# Patient Record
Sex: FEMALE | Race: WHITE | NOT HISPANIC OR LATINO | URBAN - METROPOLITAN AREA
[De-identification: names, ages, dates, MRNs, and addresses within clinical notes are randomized per-mention and may not be internally consistent; named-entity substitution may affect disease eponyms.]

---

## 2018-02-18 ENCOUNTER — EMERGENCY (EMERGENCY)
Facility: HOSPITAL | Age: 23
LOS: 1 days | Discharge: HOME | End: 2018-02-18
Attending: EMERGENCY MEDICINE | Admitting: EMERGENCY MEDICINE
Payer: COMMERCIAL

## 2018-02-18 VITALS
TEMPERATURE: 98 F | SYSTOLIC BLOOD PRESSURE: 136 MMHG | OXYGEN SATURATION: 100 % | RESPIRATION RATE: 18 BRPM | HEART RATE: 94 BPM | WEIGHT: 125.44 LBS | DIASTOLIC BLOOD PRESSURE: 91 MMHG

## 2018-02-18 DIAGNOSIS — R42 DIZZINESS AND GIDDINESS: ICD-10-CM

## 2018-02-18 LAB
ALBUMIN SERPL ELPH-MCNC: 4.3 G/DL — SIGNIFICANT CHANGE UP (ref 3.3–5)
ALP SERPL-CCNC: 44 U/L — SIGNIFICANT CHANGE UP (ref 40–120)
ALT FLD-CCNC: 11 U/L — SIGNIFICANT CHANGE UP (ref 10–45)
ANION GAP SERPL CALC-SCNC: 12 MMOL/L — SIGNIFICANT CHANGE UP (ref 5–17)
APPEARANCE UR: CLEAR — SIGNIFICANT CHANGE UP
AST SERPL-CCNC: 15 U/L — SIGNIFICANT CHANGE UP (ref 10–40)
BACTERIA # UR AUTO: PRESENT /HPF
BASOPHILS NFR BLD AUTO: 0.4 % — SIGNIFICANT CHANGE UP (ref 0–2)
BILIRUB SERPL-MCNC: 0.4 MG/DL — SIGNIFICANT CHANGE UP (ref 0.2–1.2)
BILIRUB UR-MCNC: NEGATIVE — SIGNIFICANT CHANGE UP
BUN SERPL-MCNC: 11 MG/DL — SIGNIFICANT CHANGE UP (ref 7–23)
CALCIUM SERPL-MCNC: 9.6 MG/DL — SIGNIFICANT CHANGE UP (ref 8.4–10.5)
CHLORIDE SERPL-SCNC: 103 MMOL/L — SIGNIFICANT CHANGE UP (ref 96–108)
CO2 SERPL-SCNC: 25 MMOL/L — SIGNIFICANT CHANGE UP (ref 22–31)
COLOR SPEC: YELLOW — SIGNIFICANT CHANGE UP
CREAT SERPL-MCNC: 0.71 MG/DL — SIGNIFICANT CHANGE UP (ref 0.5–1.3)
DIFF PNL FLD: NEGATIVE — SIGNIFICANT CHANGE UP
EOSINOPHIL NFR BLD AUTO: 1.6 % — SIGNIFICANT CHANGE UP (ref 0–6)
EPI CELLS # UR: (no result) /HPF (ref 0–5)
GLUCOSE SERPL-MCNC: 88 MG/DL — SIGNIFICANT CHANGE UP (ref 70–99)
GLUCOSE UR QL: NEGATIVE — SIGNIFICANT CHANGE UP
HCT VFR BLD CALC: 40.5 % — SIGNIFICANT CHANGE UP (ref 34.5–45)
HGB BLD-MCNC: 13.9 G/DL — SIGNIFICANT CHANGE UP (ref 11.5–15.5)
KETONES UR-MCNC: NEGATIVE — SIGNIFICANT CHANGE UP
LEUKOCYTE ESTERASE UR-ACNC: (no result)
LYMPHOCYTES # BLD AUTO: 25.5 % — SIGNIFICANT CHANGE UP (ref 13–44)
MCHC RBC-ENTMCNC: 28.8 PG — SIGNIFICANT CHANGE UP (ref 27–34)
MCHC RBC-ENTMCNC: 34.3 G/DL — SIGNIFICANT CHANGE UP (ref 32–36)
MCV RBC AUTO: 84 FL — SIGNIFICANT CHANGE UP (ref 80–100)
MONOCYTES NFR BLD AUTO: 9.5 % — SIGNIFICANT CHANGE UP (ref 2–14)
NEUTROPHILS NFR BLD AUTO: 63 % — SIGNIFICANT CHANGE UP (ref 43–77)
NITRITE UR-MCNC: NEGATIVE — SIGNIFICANT CHANGE UP
PH UR: 7 — SIGNIFICANT CHANGE UP (ref 5–8)
PLATELET # BLD AUTO: 218 K/UL — SIGNIFICANT CHANGE UP (ref 150–400)
POTASSIUM SERPL-MCNC: 4 MMOL/L — SIGNIFICANT CHANGE UP (ref 3.5–5.3)
POTASSIUM SERPL-SCNC: 4 MMOL/L — SIGNIFICANT CHANGE UP (ref 3.5–5.3)
PROT SERPL-MCNC: 7 G/DL — SIGNIFICANT CHANGE UP (ref 6–8.3)
PROT UR-MCNC: NEGATIVE MG/DL — SIGNIFICANT CHANGE UP
RBC # BLD: 4.82 M/UL — SIGNIFICANT CHANGE UP (ref 3.8–5.2)
RBC # FLD: 12.4 % — SIGNIFICANT CHANGE UP (ref 10.3–16.9)
RBC CASTS # UR COMP ASSIST: < 5 /HPF — SIGNIFICANT CHANGE UP
SODIUM SERPL-SCNC: 140 MMOL/L — SIGNIFICANT CHANGE UP (ref 135–145)
SP GR SPEC: 1.02 — SIGNIFICANT CHANGE UP (ref 1–1.03)
UROBILINOGEN FLD QL: 1 E.U./DL — SIGNIFICANT CHANGE UP
WBC # BLD: 6.8 K/UL — SIGNIFICANT CHANGE UP (ref 3.8–10.5)
WBC # FLD AUTO: 6.8 K/UL — SIGNIFICANT CHANGE UP (ref 3.8–10.5)
WBC UR QL: (no result) /HPF

## 2018-02-18 PROCEDURE — 87086 URINE CULTURE/COLONY COUNT: CPT

## 2018-02-18 PROCEDURE — 93010 ELECTROCARDIOGRAM REPORT: CPT

## 2018-02-18 PROCEDURE — 85025 COMPLETE CBC W/AUTO DIFF WBC: CPT

## 2018-02-18 PROCEDURE — 81001 URINALYSIS AUTO W/SCOPE: CPT

## 2018-02-18 PROCEDURE — 87184 SC STD DISK METHOD PER PLATE: CPT

## 2018-02-18 PROCEDURE — 80053 COMPREHEN METABOLIC PANEL: CPT

## 2018-02-18 PROCEDURE — 93005 ELECTROCARDIOGRAM TRACING: CPT

## 2018-02-18 PROCEDURE — 99283 EMERGENCY DEPT VISIT LOW MDM: CPT | Mod: 25

## 2018-02-18 PROCEDURE — 99284 EMERGENCY DEPT VISIT MOD MDM: CPT | Mod: 25

## 2018-02-18 PROCEDURE — 82962 GLUCOSE BLOOD TEST: CPT

## 2018-02-18 NOTE — ED PROVIDER NOTE - OBJECTIVE STATEMENT
23 yo F prev healthy in school with episodes of vague dizziness over the past week at rest and with walking- no headaches or diplopia-  no hearing difficulty- no neck pain or N/V or room spinning- her Mom  2 weeks ago- has been able to sleep and eat- feeling sad, but not depressed- no SI or HI- not hearing voices-has mild anxiety in the past-   pt denies  any chest pain or sob no leg pain or calf tenderness

## 2018-02-18 NOTE — ED PROVIDER NOTE - MEDICAL DECISION MAKING DETAILS
nl labs- not orthostatic- no ekg changes-  recent loss of mom 2 weeks ago  likely anxiety rekated 23 yo F with dizziness x 1 week no focal neuro deficits  no signs of TIA/ CVA--NIH SS=0// not orthostatic appears well hydrated-- nl labs- not orthostatic- no ekg changes-  recent loss of mom 2 weeks ago - rec neuro and psych follow up in 2 days benedryl for sleep prn-  dw father

## 2018-02-21 LAB
-  AMPICILLIN: SIGNIFICANT CHANGE UP
-  CLINDAMYCIN: SIGNIFICANT CHANGE UP
-  ERYTHROMYCIN: SIGNIFICANT CHANGE UP
-  PENICILLIN: SIGNIFICANT CHANGE UP
-  VANCOMYCIN: SIGNIFICANT CHANGE UP
CULTURE RESULTS: SIGNIFICANT CHANGE UP
METHOD TYPE: SIGNIFICANT CHANGE UP
ORGANISM # SPEC MICROSCOPIC CNT: SIGNIFICANT CHANGE UP
ORGANISM # SPEC MICROSCOPIC CNT: SIGNIFICANT CHANGE UP
SPECIMEN SOURCE: SIGNIFICANT CHANGE UP

## 2018-02-21 NOTE — ED POST DISCHARGE NOTE - RESULT SUMMARY
urine c/s discussed with pt, pt has no s/s UTI, no vaginal discharge, no treatment at this time. she will f/u with her GYN

## 2018-05-10 NOTE — ED PROVIDER NOTE - CONDUCTED A DETAILED DISCUSSION WITH PATIENT AND/OR GUARDIAN REGARDING, MDM
lab results/return to ED if symptoms worsen, persist or questions arise/need for outpatient follow-up DISPLAY PLAN FREE TEXT

## 2019-05-15 ENCOUNTER — EMERGENCY (EMERGENCY)
Facility: HOSPITAL | Age: 24
LOS: 1 days | Discharge: HOME | End: 2019-05-15
Attending: EMERGENCY MEDICINE | Admitting: EMERGENCY MEDICINE
Payer: MEDICAID

## 2019-05-15 VITALS
DIASTOLIC BLOOD PRESSURE: 76 MMHG | SYSTOLIC BLOOD PRESSURE: 111 MMHG | HEART RATE: 80 BPM | OXYGEN SATURATION: 100 % | RESPIRATION RATE: 18 BRPM | TEMPERATURE: 98 F

## 2019-05-15 VITALS
SYSTOLIC BLOOD PRESSURE: 121 MMHG | WEIGHT: 128.97 LBS | RESPIRATION RATE: 18 BRPM | DIASTOLIC BLOOD PRESSURE: 81 MMHG | HEART RATE: 94 BPM | TEMPERATURE: 98 F | OXYGEN SATURATION: 100 % | HEIGHT: 63 IN

## 2019-05-15 DIAGNOSIS — R10.84 GENERALIZED ABDOMINAL PAIN: ICD-10-CM

## 2019-05-15 DIAGNOSIS — R19.7 DIARRHEA, UNSPECIFIED: ICD-10-CM

## 2019-05-15 DIAGNOSIS — R14.0 ABDOMINAL DISTENSION (GASEOUS): ICD-10-CM

## 2019-05-15 LAB
ALBUMIN SERPL ELPH-MCNC: 4.4 G/DL — SIGNIFICANT CHANGE UP (ref 3.3–5)
ALP SERPL-CCNC: 57 U/L — SIGNIFICANT CHANGE UP (ref 40–120)
ALT FLD-CCNC: 12 U/L — SIGNIFICANT CHANGE UP (ref 10–45)
AMYLASE P1 CFR SERPL: 89 U/L — SIGNIFICANT CHANGE UP (ref 25–125)
ANION GAP SERPL CALC-SCNC: 8 MMOL/L — SIGNIFICANT CHANGE UP (ref 5–17)
APPEARANCE UR: CLEAR — SIGNIFICANT CHANGE UP
AST SERPL-CCNC: 16 U/L — SIGNIFICANT CHANGE UP (ref 10–40)
BASOPHILS # BLD AUTO: 0.05 K/UL — SIGNIFICANT CHANGE UP (ref 0–0.2)
BASOPHILS NFR BLD AUTO: 0.9 % — SIGNIFICANT CHANGE UP (ref 0–2)
BILIRUB SERPL-MCNC: 0.2 MG/DL — SIGNIFICANT CHANGE UP (ref 0.2–1.2)
BILIRUB UR-MCNC: NEGATIVE — SIGNIFICANT CHANGE UP
BUN SERPL-MCNC: 8 MG/DL — SIGNIFICANT CHANGE UP (ref 7–23)
CALCIUM SERPL-MCNC: 9.7 MG/DL — SIGNIFICANT CHANGE UP (ref 8.4–10.5)
CHLORIDE SERPL-SCNC: 104 MMOL/L — SIGNIFICANT CHANGE UP (ref 96–108)
CO2 SERPL-SCNC: 27 MMOL/L — SIGNIFICANT CHANGE UP (ref 22–31)
COLOR SPEC: YELLOW — SIGNIFICANT CHANGE UP
CREAT SERPL-MCNC: 0.62 MG/DL — SIGNIFICANT CHANGE UP (ref 0.5–1.3)
DIFF PNL FLD: NEGATIVE — SIGNIFICANT CHANGE UP
EOSINOPHIL # BLD AUTO: 0.17 K/UL — SIGNIFICANT CHANGE UP (ref 0–0.5)
EOSINOPHIL NFR BLD AUTO: 3.1 % — SIGNIFICANT CHANGE UP (ref 0–6)
GLUCOSE SERPL-MCNC: 81 MG/DL — SIGNIFICANT CHANGE UP (ref 70–99)
GLUCOSE UR QL: NEGATIVE — SIGNIFICANT CHANGE UP
HCT VFR BLD CALC: 41.5 % — SIGNIFICANT CHANGE UP (ref 34.5–45)
HGB BLD-MCNC: 13.9 G/DL — SIGNIFICANT CHANGE UP (ref 11.5–15.5)
IMM GRANULOCYTES NFR BLD AUTO: 0.2 % — SIGNIFICANT CHANGE UP (ref 0–1.5)
KETONES UR-MCNC: NEGATIVE — SIGNIFICANT CHANGE UP
LEUKOCYTE ESTERASE UR-ACNC: ABNORMAL
LIDOCAIN IGE QN: 23 U/L — SIGNIFICANT CHANGE UP (ref 7–60)
LYMPHOCYTES # BLD AUTO: 2.03 K/UL — SIGNIFICANT CHANGE UP (ref 1–3.3)
LYMPHOCYTES # BLD AUTO: 37 % — SIGNIFICANT CHANGE UP (ref 13–44)
MCHC RBC-ENTMCNC: 28.4 PG — SIGNIFICANT CHANGE UP (ref 27–34)
MCHC RBC-ENTMCNC: 33.5 GM/DL — SIGNIFICANT CHANGE UP (ref 32–36)
MCV RBC AUTO: 84.9 FL — SIGNIFICANT CHANGE UP (ref 80–100)
MONOCYTES # BLD AUTO: 0.53 K/UL — SIGNIFICANT CHANGE UP (ref 0–0.9)
MONOCYTES NFR BLD AUTO: 9.7 % — SIGNIFICANT CHANGE UP (ref 2–14)
NEUTROPHILS # BLD AUTO: 2.7 K/UL — SIGNIFICANT CHANGE UP (ref 1.8–7.4)
NEUTROPHILS NFR BLD AUTO: 49.1 % — SIGNIFICANT CHANGE UP (ref 43–77)
NITRITE UR-MCNC: NEGATIVE — SIGNIFICANT CHANGE UP
NRBC # BLD: 0 /100 WBCS — SIGNIFICANT CHANGE UP (ref 0–0)
PH UR: 6.5 — SIGNIFICANT CHANGE UP (ref 5–8)
PLATELET # BLD AUTO: 221 K/UL — SIGNIFICANT CHANGE UP (ref 150–400)
POTASSIUM SERPL-MCNC: 4 MMOL/L — SIGNIFICANT CHANGE UP (ref 3.5–5.3)
POTASSIUM SERPL-SCNC: 4 MMOL/L — SIGNIFICANT CHANGE UP (ref 3.5–5.3)
PROT SERPL-MCNC: 7.5 G/DL — SIGNIFICANT CHANGE UP (ref 6–8.3)
PROT UR-MCNC: NEGATIVE MG/DL — SIGNIFICANT CHANGE UP
RBC # BLD: 4.89 M/UL — SIGNIFICANT CHANGE UP (ref 3.8–5.2)
RBC # FLD: 12.3 % — SIGNIFICANT CHANGE UP (ref 10.3–14.5)
SODIUM SERPL-SCNC: 139 MMOL/L — SIGNIFICANT CHANGE UP (ref 135–145)
SP GR SPEC: 1.01 — SIGNIFICANT CHANGE UP (ref 1–1.03)
UROBILINOGEN FLD QL: 0.2 E.U./DL — SIGNIFICANT CHANGE UP
WBC # BLD: 5.49 K/UL — SIGNIFICANT CHANGE UP (ref 3.8–10.5)
WBC # FLD AUTO: 5.49 K/UL — SIGNIFICANT CHANGE UP (ref 3.8–10.5)

## 2019-05-15 PROCEDURE — 81001 URINALYSIS AUTO W/SCOPE: CPT

## 2019-05-15 PROCEDURE — 36415 COLL VENOUS BLD VENIPUNCTURE: CPT

## 2019-05-15 PROCEDURE — 80053 COMPREHEN METABOLIC PANEL: CPT

## 2019-05-15 PROCEDURE — 99283 EMERGENCY DEPT VISIT LOW MDM: CPT

## 2019-05-15 PROCEDURE — 83690 ASSAY OF LIPASE: CPT

## 2019-05-15 PROCEDURE — 99284 EMERGENCY DEPT VISIT MOD MDM: CPT

## 2019-05-15 PROCEDURE — 85025 COMPLETE CBC W/AUTO DIFF WBC: CPT

## 2019-05-15 PROCEDURE — 82150 ASSAY OF AMYLASE: CPT

## 2019-05-15 NOTE — ED PROVIDER NOTE - ATTENDING CONTRIBUTION TO CARE
intermittent abdominal discomfort/ bloating for past 2 months with change in stool color (green).  has had before but not for this long.  currently no pain, abd soft, non tender.  well appearing.  basic labs wnl.  no liver dysfunction.  to f/u with pmd, r/o ibs

## 2019-05-15 NOTE — ED ADULT NURSE NOTE - CHPI ED NUR SYMPTOMS NEG
no hematuria/no vomiting/no nausea/no fever/no abdominal distension/no burning urination/no chills/no diarrhea/no dysuria

## 2019-05-15 NOTE — ED PROVIDER NOTE - OBJECTIVE STATEMENT
24 yo female in the ER c/o abdominal bloating and cramping pains x 2 month. pain is intermittent, mostly after the meal. Pt also mentioned that her stool has been green color with occasional diarrhea. Pt denies f/c, n/v, appetite changes or diet changes, denies weight loss, new medications intake, recent travel. pt admits that she has been having some difficulties with medical insurance and couldn't see her doctor, decided today to come to ER for evaluation.

## 2019-05-15 NOTE — ED PROVIDER NOTE - CLINICAL SUMMARY MEDICAL DECISION MAKING FREE TEXT BOX
22 yo female in the ER due to intermittent abdominal pain, bloating, changes in BM x 2 month. Appears well and has no pain today. denies weight loss, appetite changes or diet changes, denies recent travel or new medications intake. exam- unremarkable, soft m, benign abdomen. basic labs done- no acute findings. will d/c for out pt f/u with GI.

## 2019-05-15 NOTE — ED ADULT NURSE NOTE - NSIMPLEMENTINTERV_GEN_ALL_ED
Implemented All Universal Safety Interventions:  [place] to call system. Call bell, personal items and telephone within reach. Instruct patient to call for assistance. Room bathroom lighting operational. Non-slip footwear when patient is off stretcher. Physically safe environment: no spills, clutter or unnecessary equipment. Stretcher in lowest position, wheels locked, appropriate side rails in place.

## 2019-05-15 NOTE — ED ADULT TRIAGE NOTE - CHIEF COMPLAINT QUOTE
"my stool is green" x two months. Pt endorses intermittent mild abdom pain and bloating. Reports family hx of gallstones.

## 2019-05-15 NOTE — ED PROVIDER NOTE - NSFOLLOWUPCLINICS_GEN_ALL_ED_FT
[facility]  Family Medicine  [facility]. [address]  [place], [place] [facility]  Phone: [phone]  Fax:   Follow Up Time:

## 2019-05-15 NOTE — ED PROVIDER NOTE - CARE PROVIDER_API CALL
Stone Benjamin)  [facility]  [address], [address]  [place] [address]  Phone: [phone]  Fax: [phone]  Follow Up Time:

## 2019-05-15 NOTE — ED PROVIDER NOTE - NSFOLLOWUPINSTRUCTIONS_ED_ALL_ED_FT
I have discussed the discharge plan with the patient. The patient agrees with the plan, as discussed.  The patient understands Emergency Department diagnosis is a preliminary diagnosis often based on limited information and that the patient must adhere to the follow-up plan as discussed.  The patient understands that if the symptoms worsen or if prescribed medications do not have the desired/planned effect that the patient may return to the Emergency Department at any time for further evaluation and treatment.      Abdominal Pain, Adult  Abdominal pain can be caused by many things. Often, abdominal pain is not serious and it gets better with no treatment or by being treated at home. However, sometimes abdominal pain is serious. Your health care provider will do a medical history and a physical exam to try to determine the cause of your abdominal pain.    Follow these instructions at home:  Take over-the-counter and prescription medicines only as told by your health care provider. Do not take a laxative unless told by your health care provider.  Drink enough fluid to keep your urine clear or pale yellow.  Watch your condition for any changes.  Keep all follow-up visits as told by your health care provider. This is important.  Contact a health care provider if:  Your abdominal pain changes or gets worse.  You are not hungry or you lose weight without trying.  You are constipated or have diarrhea for more than 2–3 days.  You have pain when you urinate or have a bowel movement.  Your abdominal pain wakes you up at night.  Your pain gets worse with meals, after eating, or with certain foods.  You are throwing up and cannot keep anything down.  You have a fever.  Get help right away if:  Your pain does not go away as soon as your health care provider told you to expect.  You cannot stop throwing up.  Your pain is only in areas of the abdomen, such as the right side or the left lower portion of the abdomen.  You have bloody or black stools, or stools that look like tar.  You have severe pain, cramping, or bloating in your abdomen.  You have signs of dehydration, such as:  Dark urine, very little urine, or no urine.  Cracked lips.  Dry mouth.  Sunken eyes.  Sleepiness.  Weakness.  This information is not intended to replace advice given to you by your health care provider. Make sure you discuss any questions you have with your health care provider.            ABDOMINAL PAIN - AfterCare(R) Instructions(ER/ED)     Abdominal Pain    WHAT YOU NEED TO KNOW:    Abdominal pain can be dull, achy, or sharp. You may have pain in one area of your abdomen, or in your entire abdomen. Your pain may be caused by a condition such as constipation, food sensitivity or poisoning, infection, or a blockage. Abdominal pain can also be from a hernia, appendicitis, or an ulcer. Liver, gallbladder, or kidney conditions can also cause abdominal pain. The cause of your abdominal pain may be unknown.     DISCHARGE INSTRUCTIONS:    Return to the emergency department if:     You have new chest pain or shortness of breath.      You have pulsing pain in your upper abdomen or lower back that suddenly becomes constant.      Your pain is in the right lower abdominal area and worsens with movement.       You have a fever over 100.4°F (38°C) or shaking chills.       You are vomiting and cannot keep food or liquids down.       Your pain does not improve or gets worse over the next 8 to 12 hours.       You see blood in your vomit or bowel movements, or they look black and tarry.       Your skin or the whites of your eyes turn yellow.       You are a woman and have a large amount of vaginal bleeding that is not your monthly period.     Contact your healthcare provider if:     You have pain in your lower back.       You are a man and have pain in your testicles.      You have pain when you urinate.       You have questions or concerns about your condition or care.    Follow up with your healthcare provider within 24 hours or as directed: Write down your questions so you remember to ask them during your visits.     Medicines:     Medicines may be given to calm your stomach and prevent vomiting or to decrease pain. Ask how to take pain medicine safely.      Take your medicine as directed. Contact your healthcare provider if you think your medicine is not helping or if you have side effects. Tell him of her if you are allergic to any medicine. Keep a list of the medicines, vitamins, and herbs you take. Include the amounts, and when and why you take them. Bring the list or the pill bottles to follow-up visits. Carry your medicine list with you in case of an emergency.

## 2019-12-26 ENCOUNTER — EMERGENCY (EMERGENCY)
Facility: HOSPITAL | Age: 24
LOS: 1 days | Discharge: HOME | End: 2019-12-26
Admitting: EMERGENCY MEDICINE
Payer: COMMERCIAL

## 2019-12-26 VITALS
DIASTOLIC BLOOD PRESSURE: 71 MMHG | OXYGEN SATURATION: 100 % | WEIGHT: 139.99 LBS | HEART RATE: 81 BPM | RESPIRATION RATE: 18 BRPM | TEMPERATURE: 98 F | SYSTOLIC BLOOD PRESSURE: 121 MMHG

## 2019-12-26 PROBLEM — K80.20 CALCULUS OF GALLBLADDER WITHOUT CHOLECYSTITIS WITHOUT OBSTRUCTION: Chronic | Status: ACTIVE | Noted: 2019-05-15

## 2019-12-26 PROCEDURE — 99283 EMERGENCY DEPT VISIT LOW MDM: CPT

## 2019-12-26 PROCEDURE — 99282 EMERGENCY DEPT VISIT SF MDM: CPT

## 2019-12-26 RX ORDER — DIPHENHYDRAMINE HCL 50 MG
50 CAPSULE ORAL ONCE
Refills: 0 | Status: COMPLETED | OUTPATIENT
Start: 2019-12-26 | End: 2019-12-26

## 2019-12-26 RX ORDER — DIPHENHYDRAMINE HCL 50 MG
1 CAPSULE ORAL
Qty: 15 | Refills: 0
Start: 2019-12-26 | End: 2019-12-30

## 2019-12-26 RX ORDER — FAMOTIDINE 10 MG/ML
1 INJECTION INTRAVENOUS
Qty: 10 | Refills: 0
Start: 2019-12-26 | End: 2019-12-30

## 2019-12-26 RX ADMIN — Medication 50 MILLIGRAM(S): at 14:16

## 2019-12-26 NOTE — ED PROVIDER NOTE - CLINICAL SUMMARY MEDICAL DECISION MAKING FREE TEXT BOX
25 yo F with no pmh c/o itching rash to upper chest and back since this morning. Denies fever, chills, n/v, lip/tongue swelling, dysphagia or sob. Pt denies any new medications, detergents, lotions or exposures. Used a new foundation but did not put it on her chest. +fine papular rash to chest and upper back. ?allergic reaction. No oral involvement. Given benadryl.

## 2019-12-26 NOTE — ED PROVIDER NOTE - OBJECTIVE STATEMENT
25 yo F with no pmh c/o itching rash to upper chest and back since this morning. Denies fever, chills, n/v, lip/tongue swelling, dysphagia or sob. Pt denies any new medications, detergents, lotions or exposures. Used a new foundation but did not put it on her chest.

## 2019-12-26 NOTE — ED PROVIDER NOTE - NSFOLLOWUPINSTRUCTIONS_ED_ALL_ED_FT
Rash    A rash is a change in the color of the skin. A rash can also change the way your skin feels. There are many different conditions and factors that can cause a rash, most of which are not dangerous. Make sure to follow up with your primary care physician or a dermatologist as instructed by your health care provider.    SEEK IMMEDIATE MEDICAL CARE IF YOU HAVE ANY OF THE FOLLOWING SYMPTOMS: fever, blisters, a rash inside your mouth, vaginal or anal pain, or altered mental status.    Allergic Reaction    An allergic reaction is an abnormal reaction to a substance (allergen) by the body's defense system. Common allergens include medicines, food, insect bites or stings, and blood products. The body releases certain proteins into the blood that can cause a variety of symptoms such as an itchy rash, wheezing, swelling of the face/lips/tongue/throat, abdominal pain, nausea or vomiting. An allergic reaction is usually treated with medication. If your health care provider prescribed you an epinephrine injection device, make sure to keep it with you at all times.    SEEK IMMEDIATE MEDICAL CARE IF YOU HAVE ANY OF THE FOLLOWING SYMPTOMS: allergic reaction severe enough that required you to use epinephrine, tightness in your chest, swelling around your lips/tongue/throat, abdominal pain, vomiting or diarrhea, or lightheadedness/dizziness. These symptoms may represent a serious problem that is an emergency. Do not wait to see if the symptoms will go away. Use your auto-injector pen or anaphylaxis kit as you have been instructed. Call 911 and do not drive yourself to the hospital.

## 2019-12-26 NOTE — ED PROVIDER NOTE - PHYSICAL EXAMINATION
CONSTITUTIONAL: Well-appearing; well-nourished; in no apparent distress.   HEAD: Normocephalic; atraumatic.   EYES: PERRL; EOM intact; conjunctiva and sclera clear  ENT: normal nose; no rhinorrhea; normal pharynx with no erythema or lesions.   NECK: Supple; non-tender;   CARDIOVASCULAR: Normal S1, S2; no murmurs, rubs, or gallops. Regular rate and rhythm.   RESPIRATORY: Breathing easily; breath sounds clear and equal bilaterally; no wheezes, rhonchi, or rales.  MSK: FROM at all extremities, normal tone   EXT: No cyanosis or edema; N/V intact  SKIN: +fine papular rash to chest and upper back

## 2019-12-26 NOTE — ED PROVIDER NOTE - PATIENT PORTAL LINK FT
You can access the FollowMyHealth Patient Portal offered by [facility] by registering at the following website: [email or web address]. By joining [employer]’s FollowMyHealth portal, you will also be able to view your health information using other applications (apps) compatible with our system.

## 2019-12-26 NOTE — ED ADULT TRIAGE NOTE - CHIEF COMPLAINT QUOTE
pt complaining of itchy rash to chest and back area first noticed this morning denies known allergies no new detergents changes in routine or new foods. Tried new make up foundation today but face is not affected. Denies SOB tongue or throat itching respirations even and unlabored no recent travel

## 2019-12-30 DIAGNOSIS — R21 RASH AND OTHER NONSPECIFIC SKIN ERUPTION: ICD-10-CM

## 2020-03-19 ENCOUNTER — EMERGENCY (EMERGENCY)
Facility: HOSPITAL | Age: 25
LOS: 1 days | Discharge: HOME | End: 2020-03-19
Admitting: EMERGENCY MEDICINE
Payer: COMMERCIAL

## 2020-03-19 VITALS
HEART RATE: 106 BPM | WEIGHT: 142.64 LBS | TEMPERATURE: 98 F | DIASTOLIC BLOOD PRESSURE: 80 MMHG | SYSTOLIC BLOOD PRESSURE: 147 MMHG | OXYGEN SATURATION: 98 % | RESPIRATION RATE: 18 BRPM | HEIGHT: 64 IN

## 2020-03-19 DIAGNOSIS — R05 COUGH: ICD-10-CM

## 2020-03-19 DIAGNOSIS — B34.9 VIRAL INFECTION, UNSPECIFIED: ICD-10-CM

## 2020-03-19 LAB — RAPID RVP RESULT: SIGNIFICANT CHANGE UP

## 2020-03-19 PROCEDURE — 87581 M.PNEUMON DNA AMP PROBE: CPT

## 2020-03-19 PROCEDURE — 87798 DETECT AGENT NOS DNA AMP: CPT

## 2020-03-19 PROCEDURE — 87633 RESP VIRUS 12-25 TARGETS: CPT

## 2020-03-19 PROCEDURE — 99283 EMERGENCY DEPT VISIT LOW MDM: CPT

## 2020-03-19 PROCEDURE — 87486 CHLMYD PNEUM DNA AMP PROBE: CPT

## 2020-03-19 PROCEDURE — 99284 EMERGENCY DEPT VISIT MOD MDM: CPT

## 2020-03-19 NOTE — ED PROVIDER NOTE - CLINICAL SUMMARY MEDICAL DECISION MAKING FREE TEXT BOX
25 y/o F presenting to the ED with cough, SOB, tactile F since this morning. Patient appears well, nontoxic, with HR of 106, T of 97.6, 98% O2 sat. On exam, patient appears well and nontoxic, boggy nares, clear nasal discharge bilaterally but no posterior oral pharynx erythema. Lungs clear to ascultation. ? viral like syndrome. As per covid19 screening algorithm pt fits into cluster groups concern for covid with respiratory complaint but has NO recent travel or close contact with confirmed case;  RVP sent,  tracking tool complete (no covid testing as per algorithm due to limited access to testing-low priority). Pt therefore educated on supportive care/hand hygiene, 14 day home isolation only leaving for essentials/avoiding large groups/public transportation and given information as to why he/she was not tested.  discussed strict return parameters and pmd follow up-calling pmd before seeking further medical attention- given appropriate contact information to call for resources 23 y/o F presenting to the ED with cough, SOB, tactile F since this morning. Patient appears well, nontoxic, with HR of 106, T of 97.6, 98% O2 sat. On exam, patient appears well and nontoxic, boggy nares, clear nasal discharge bilaterally but no posterior oral pharynx erythema. Lungs clear to ascultation. ? viral like syndrome vs allergies . As per covid19 screening algorithm pt fits into cluster groups concern for covid with respiratory complaint but has NO recent travel or close contact with confirmed case;  RVP sent,  tracking tool complete (no covid testing as per algorithm due to limited access to testing-low priority). Pt therefore educated on supportive care/hand hygiene, 14 day home isolation only leaving for essentials/avoiding large groups/public transportation and given information as to why he/she was not tested.  discussed strict return parameters and pmd follow up-calling pmd before seeking further medical attention- given appropriate contact information to call for resources

## 2020-03-19 NOTE — ED PROVIDER NOTE - NSFOLLOWUPINSTRUCTIONS_ED_ALL_ED_FT
If you develop worsening symptoms, such as severe shortness of breath, please call [phone] option #9. They will assist you in determining your next steps.     You will receive a call today with your viral swab results.  As of now you should isolate yourself for the next 14 days by staying home from work/school or other obligations.  Avoid highly populated and public areas. Avoid using public transportation, ride-sharing, or taxis.   As much as possible, separate yourself from other people in your home. If you can, you should sleep in a room away from other people in your home. Also, you should use a separate bathroom, if available.   Wear the supplied mask whenever you are around other people.   If you have a non-urgent medical appointment, please reschedule for a later date. If the appointment is urgent, please call the healthcare provider first   Wash your hands often with soap and water for at least 15 to 20 seconds or clean your hands with an alcohol-based hand  that contains 60 to 95% alcohol, covering all surfaces of your hands and rubbing them together until they feel dry. Soap and water should be used preferentially if hands are visibly dirty.   Cover your mouth and nose with a tissue when you cough or sneeze. Throw used tissues in a lined trash can; immediately wash your hands.   Avoid touching your eyes, nose, and mouth with your hands.   Avoid sharing personal household items. You should not share dishes, drinking glasses, cups, eating utensils, towels, or bedding with other people or pets in your home. After using these items, they should be washed thoroughly with soap and water.   Clean and disinfect all “high-touch” surfaces every day. High touch surfaces include counters, tabletops, doorknobs, light switches, remote controls, bathroom fixtures, toilets, phones, keyboards, tablets, and bedside tables. Also, clean any surfaces that may have blood, stool, or body fluids on them.

## 2020-03-19 NOTE — ED PROVIDER NOTE - PHYSICAL EXAMINATION
General: Patient is well developed and well nourised. Patient is alert and oriented to person, place and date. Patient is laying comfortably in stretcher and appears in no acute distress.  HEENT: Head is normocephalic and atraumatic. Pupils are equal, round and reactive to light and accommodation. Extraocular movements intact. No evidence of nystagmus, conjunctival injection, or scleral icterus. External ears symmetric and non-tender without evidence of discharge. Ear canals are clear without evidence of edema or erythema. Cone of light evident on tympanic membrade without evidence of erythema, retraction or bulge. No hemotympanum present bilaterally.  Nose is symmetric, non-tender, patent without evidence of discharge. Teeth in good repair. Uvula midline. Pharynx without erythema, edema, tonsillar enlargement or exudates.   Neck: Supple and nontender, with no evidence of lymphadenopathy. No cervical spinal tenderness. Full range of motion.  Heart: Regular rate and rhythm. No murmurs, rubs or gallops.   Lungs: Clear to auscultation bilaterally with equal chest expansion. No note of wheezes, rhonchi, rales. Equal chest expansion. No note of retractions.  Abdomen: Bowel sounds present in all four quadrants. Soft, non-tender, non-distended without signs of masses, rebound or guarding. No note of hepatosplenomegaly. No CVA tenderness bilaterally. Negative Rivas sign. No pain present over McBurney's point.  Musculoskeletal: No edema, erythema, ecchymosis, atrophy or deformity. Full range of motion in all four extremities.  No clubbing or cyanosis. No point tenderness to palpation.   Neuro: Cranial nerves intact. GCS 15. Moving all extremities without discomfort. Sensation intact. Gait steady  Skin: Warm, dry and intact without evidence of rashes, bruising, pallor, jaundice or cyanosis.   Psych: Mood and affect appropriate.

## 2020-03-19 NOTE — ED PROVIDER NOTE - PATIENT PORTAL LINK FT
You can access the FollowMyHealth Patient Portal offered by [facility] by registering at the following website: [email or web address]. By joining [employer]’s FollowMyHealth portal, you will also be able to view your health information using other applications (apps) compatible with our system.

## 2020-03-19 NOTE — ED PROVIDER NOTE - OBJECTIVE STATEMENT
23 y/o F presenting to the ED with cough, sensations of SOB, which began earlier this morning when she woke up. Felt as if something was in her throat but had no chest pain, palpitations. Felt very nervous and states that she was so worried that she cried. 25 y/o F presenting to the ED with cough, sensations of SOB, which began earlier this morning when she woke up. Felt as if something was in her throat but had no chest pain, palpitations. Felt very nervous and states that she was so worried that she cried. No history of smoking, + COVID contacts, high risk travel, or any other complaints. 23 y/o F presenting to the ED with cough, sensations of SOB, which began earlier this morning when she woke up. Felt as if something was in her throat but had no chest pain, palpitations. Felt very nervous and states that she was so worried that she cried. No history of smoking, + COVID contacts, high risk travel, or any other complaints. patient questions if she had an allergic reaction.

## 2020-03-19 NOTE — ED ADULT NURSE NOTE - OBJECTIVE STATEMENT
25 y/o female presents to ED c/o dry cough associated w/ mild sore throat. Denies fever, chills, +COVID contacts, CP, and any other sx.

## 2020-03-29 ENCOUNTER — EMERGENCY (EMERGENCY)
Facility: HOSPITAL | Age: 25
LOS: 1 days | Discharge: HOME | End: 2020-03-29
Attending: EMERGENCY MEDICINE | Admitting: EMERGENCY MEDICINE
Payer: COMMERCIAL

## 2020-03-29 VITALS
HEART RATE: 137 BPM | SYSTOLIC BLOOD PRESSURE: 123 MMHG | OXYGEN SATURATION: 100 % | RESPIRATION RATE: 18 BRPM | DIASTOLIC BLOOD PRESSURE: 88 MMHG | HEIGHT: 64 IN | TEMPERATURE: 98 F | WEIGHT: 139.99 LBS

## 2020-03-29 VITALS
RESPIRATION RATE: 20 BRPM | OXYGEN SATURATION: 100 % | HEART RATE: 99 BPM | TEMPERATURE: 99 F | SYSTOLIC BLOOD PRESSURE: 135 MMHG | DIASTOLIC BLOOD PRESSURE: 81 MMHG

## 2020-03-29 DIAGNOSIS — R06.02 SHORTNESS OF BREATH: ICD-10-CM

## 2020-03-29 DIAGNOSIS — R05 COUGH: ICD-10-CM

## 2020-03-29 DIAGNOSIS — R07.9 CHEST PAIN, UNSPECIFIED: ICD-10-CM

## 2020-03-29 DIAGNOSIS — R50.9 FEVER, UNSPECIFIED: ICD-10-CM

## 2020-03-29 LAB
ALBUMIN SERPL ELPH-MCNC: 4.7 G/DL — SIGNIFICANT CHANGE UP (ref 3.3–5)
ALP SERPL-CCNC: 48 U/L — SIGNIFICANT CHANGE UP (ref 40–120)
ALT FLD-CCNC: 19 U/L — SIGNIFICANT CHANGE UP (ref 10–45)
ANION GAP SERPL CALC-SCNC: 17 MMOL/L — SIGNIFICANT CHANGE UP (ref 5–17)
AST SERPL-CCNC: 22 U/L — SIGNIFICANT CHANGE UP (ref 10–40)
BASOPHILS # BLD AUTO: 0.01 K/UL — SIGNIFICANT CHANGE UP (ref 0–0.2)
BASOPHILS NFR BLD AUTO: 0.1 % — SIGNIFICANT CHANGE UP (ref 0–2)
BILIRUB SERPL-MCNC: 0.7 MG/DL — SIGNIFICANT CHANGE UP (ref 0.2–1.2)
BUN SERPL-MCNC: 7 MG/DL — SIGNIFICANT CHANGE UP (ref 7–23)
CALCIUM SERPL-MCNC: 10 MG/DL — SIGNIFICANT CHANGE UP (ref 8.4–10.5)
CHLORIDE SERPL-SCNC: 103 MMOL/L — SIGNIFICANT CHANGE UP (ref 96–108)
CO2 SERPL-SCNC: 19 MMOL/L — LOW (ref 22–31)
CREAT SERPL-MCNC: 0.71 MG/DL — SIGNIFICANT CHANGE UP (ref 0.5–1.3)
D DIMER BLD IA.RAPID-MCNC: <150 NG/ML DDU — SIGNIFICANT CHANGE UP
EOSINOPHIL # BLD AUTO: 0.04 K/UL — SIGNIFICANT CHANGE UP (ref 0–0.5)
EOSINOPHIL NFR BLD AUTO: 0.6 % — SIGNIFICANT CHANGE UP (ref 0–6)
GLUCOSE SERPL-MCNC: 83 MG/DL — SIGNIFICANT CHANGE UP (ref 70–99)
HCG SERPL-ACNC: <0 MIU/ML — SIGNIFICANT CHANGE UP
HCT VFR BLD CALC: 45 % — SIGNIFICANT CHANGE UP (ref 34.5–45)
HGB BLD-MCNC: 15.1 G/DL — SIGNIFICANT CHANGE UP (ref 11.5–15.5)
IMM GRANULOCYTES NFR BLD AUTO: 0.4 % — SIGNIFICANT CHANGE UP (ref 0–1.5)
LYMPHOCYTES # BLD AUTO: 1.44 K/UL — SIGNIFICANT CHANGE UP (ref 1–3.3)
LYMPHOCYTES # BLD AUTO: 21 % — SIGNIFICANT CHANGE UP (ref 13–44)
MCHC RBC-ENTMCNC: 27.5 PG — SIGNIFICANT CHANGE UP (ref 27–34)
MCHC RBC-ENTMCNC: 33.6 GM/DL — SIGNIFICANT CHANGE UP (ref 32–36)
MCV RBC AUTO: 82 FL — SIGNIFICANT CHANGE UP (ref 80–100)
MONOCYTES # BLD AUTO: 0.43 K/UL — SIGNIFICANT CHANGE UP (ref 0–0.9)
MONOCYTES NFR BLD AUTO: 6.3 % — SIGNIFICANT CHANGE UP (ref 2–14)
NEUTROPHILS # BLD AUTO: 4.91 K/UL — SIGNIFICANT CHANGE UP (ref 1.8–7.4)
NEUTROPHILS NFR BLD AUTO: 71.6 % — SIGNIFICANT CHANGE UP (ref 43–77)
NRBC # BLD: 0 /100 WBCS — SIGNIFICANT CHANGE UP (ref 0–0)
PLATELET # BLD AUTO: 228 K/UL — SIGNIFICANT CHANGE UP (ref 150–400)
POTASSIUM SERPL-MCNC: 3.9 MMOL/L — SIGNIFICANT CHANGE UP (ref 3.5–5.3)
POTASSIUM SERPL-SCNC: 3.9 MMOL/L — SIGNIFICANT CHANGE UP (ref 3.5–5.3)
PROT SERPL-MCNC: 7.7 G/DL — SIGNIFICANT CHANGE UP (ref 6–8.3)
RBC # BLD: 5.49 M/UL — HIGH (ref 3.8–5.2)
RBC # FLD: 12.1 % — SIGNIFICANT CHANGE UP (ref 10.3–14.5)
SODIUM SERPL-SCNC: 139 MMOL/L — SIGNIFICANT CHANGE UP (ref 135–145)
TROPONIN T SERPL-MCNC: <0.01 NG/ML — SIGNIFICANT CHANGE UP (ref 0–0.01)
WBC # BLD: 6.86 K/UL — SIGNIFICANT CHANGE UP (ref 3.8–10.5)
WBC # FLD AUTO: 6.86 K/UL — SIGNIFICANT CHANGE UP (ref 3.8–10.5)

## 2020-03-29 PROCEDURE — 36415 COLL VENOUS BLD VENIPUNCTURE: CPT

## 2020-03-29 PROCEDURE — 99283 EMERGENCY DEPT VISIT LOW MDM: CPT

## 2020-03-29 PROCEDURE — 87635 SARS-COV-2 COVID-19 AMP PRB: CPT

## 2020-03-29 PROCEDURE — 85025 COMPLETE CBC W/AUTO DIFF WBC: CPT

## 2020-03-29 PROCEDURE — 71045 X-RAY EXAM CHEST 1 VIEW: CPT | Mod: 26

## 2020-03-29 PROCEDURE — 84702 CHORIONIC GONADOTROPIN TEST: CPT

## 2020-03-29 PROCEDURE — 99285 EMERGENCY DEPT VISIT HI MDM: CPT

## 2020-03-29 PROCEDURE — 80053 COMPREHEN METABOLIC PANEL: CPT

## 2020-03-29 PROCEDURE — 85379 FIBRIN DEGRADATION QUANT: CPT

## 2020-03-29 PROCEDURE — 84484 ASSAY OF TROPONIN QUANT: CPT

## 2020-03-29 PROCEDURE — 71045 X-RAY EXAM CHEST 1 VIEW: CPT

## 2020-03-29 RX ORDER — ALBUTEROL 90 UG/1
2 AEROSOL, METERED ORAL
Qty: 1 | Refills: 0
Start: 2020-03-29 | End: 2020-04-04

## 2020-03-29 RX ORDER — SODIUM CHLORIDE 9 MG/ML
500 INJECTION INTRAMUSCULAR; INTRAVENOUS; SUBCUTANEOUS ONCE
Refills: 0 | Status: COMPLETED | OUTPATIENT
Start: 2020-03-29 | End: 2020-03-29

## 2020-03-29 RX ORDER — ACETAMINOPHEN 500 MG
1000 TABLET ORAL ONCE
Refills: 0 | Status: COMPLETED | OUTPATIENT
Start: 2020-03-29 | End: 2020-03-29

## 2020-03-29 RX ADMIN — SODIUM CHLORIDE 500 MILLILITER(S): 9 INJECTION INTRAMUSCULAR; INTRAVENOUS; SUBCUTANEOUS at 15:34

## 2020-03-29 RX ADMIN — Medication 1000 MILLIGRAM(S): at 15:43

## 2020-03-29 NOTE — ED PROVIDER NOTE - PATIENT PORTAL LINK FT
You can access the FollowMyHealth Patient Portal offered by [facility] by registering at the following website: [email or web address]. By joining [employer]’s FollowMyHealth portal, you will also be able to view your health information using other applications (apps) compatible with our system.

## 2020-03-29 NOTE — ED PROVIDER NOTE - OBJECTIVE STATEMENT
24y F with no PMHx presents to the ED with complains of cough and fatigue occurring over the past 10 days. Patient states over the past couple days dry cough and having chest pain and shortness of breath now, shortness of breath is non positional and increases with movement. Patient denies fever, chills, headache, dizziness, abdominal pain, nausea, vomiting, or diarrhea. Patient reports her boyfriend has similar symptoms. Patient denies international travel, smoking history, history of DVT or PE, and no OCPU's. 24y F with no PMHx presents to the ED with complains of cough and fatigue occurring over the past 10 days. Patient states over the past couple days dry cough and having chest pain and shortness of breath now, shortness of breath is non positional and increases with exertion. Patient denies fever, chills, headache, dizziness, abdominal pain, nausea, vomiting, or diarrhea. Patient reports her boyfriend has similar symptoms. Patient denies international travel, smoking history, history of DVT or PE, and no OCP's.

## 2020-03-29 NOTE — ED ADULT TRIAGE NOTE - CHIEF COMPLAINT QUOTE
pt c/o difficulty breathing, pt states " I went to another hospital and they told me I had pneumonia, I'm freaking out" pt crying on triage, able to speak clear and in full sentences. ekg in progress.

## 2020-03-29 NOTE — ED PROVIDER NOTE - CLINICAL SUMMARY MEDICAL DECISION MAKING FREE TEXT BOX
23 y/o female with cough, subjective fever and sob. d-dimer negative, not likely pe. Patient likely has a viral Upper respiratory infection; no evidence of pneumonia, sepsis or meningitis;   The patient is non toxic appearing. Supportive care including increased fluids and over the counter therapy was advised and discussed. COVID-19 testing pending. Patient is otherwise non-toxic and well-appearing in no respiratory distress. No known hx of exposure to positive coronavirus cases or international travel to ([place], [place], [place], [place], [place]). Patient has been given education on Novel Coronavirus and testing education. Patient has been advised to self-quarantine for 14 days and given return precautions for any concerning or worsening of their symptoms. Pt given reassurance. Advised to continue cefdinir and azithromycin. Will add inhaler. Given return precautions.

## 2020-03-29 NOTE — ED PROVIDER NOTE - PSYCHIATRIC, MLM
Alert and oriented to person, place, time/situation. normal mood and affect. no apparent risk to self or others. Slightly anxious.

## 2020-03-29 NOTE — ED PROVIDER NOTE - ATTENDING CONTRIBUTION TO CARE
Attending Statement: I have personally performed a face to face diagnostic evaluation on this patient. I have reviewed the ACP note and agree with the history, exam and plan of care, except as noted.     Attending Contribution to Care:  24F with uri sx, likely covid, the pt was tested and told they will be informed of results within 5-7 days. VSS, no resp distress, tolerating PO. Does not require admission and was given instructions for self-quaratine and supportive care at home (hydration, tylenol).  Instructed to return for any concerning or worsening sx such as severe shortness of breath.

## 2020-03-29 NOTE — ED ADULT NURSE NOTE - OBJECTIVE STATEMENT
c/o dry cough accompanied with diarrhea (described as being watery ~ >10 episodes/day) and unable to tolerate PO intake. Pt states was at [facility] and x-ray done which noted and quotes "viral pneumonia and I was given an antibiotic 2 days ago." Pt is tearful and reported shortness of breath and chest pain, however is able tot speak in full sentences without stopping for breath. Denies abdominal pain, nausea, vomiting, dysuria or fever. PHX Anemia and self supplements with Iron.

## 2020-03-29 NOTE — ED PROVIDER NOTE - NSFOLLOWUPINSTRUCTIONS_ED_ALL_ED_FT
Your COVID-19 testing is pending. You will be called in 3-5 days regarding your results.  You has been given education on Novel Coronavirus and testing education. Please self-quarantine for 14 days and follow strict guidelines. Please return to the ED for any concerning or worsening of your symptoms.    Please continue and complete your treatment.

## 2020-03-30 LAB — SARS-COV-2 RNA SPEC QL NAA+PROBE: DETECTED

## 2020-03-31 ENCOUNTER — EMERGENCY (EMERGENCY)
Facility: HOSPITAL | Age: 25
LOS: 1 days | Discharge: HOME | End: 2020-03-31
Attending: EMERGENCY MEDICINE | Admitting: EMERGENCY MEDICINE
Payer: COMMERCIAL

## 2020-03-31 VITALS
DIASTOLIC BLOOD PRESSURE: 82 MMHG | HEART RATE: 100 BPM | TEMPERATURE: 98 F | HEIGHT: 64 IN | RESPIRATION RATE: 18 BRPM | SYSTOLIC BLOOD PRESSURE: 146 MMHG | OXYGEN SATURATION: 99 %

## 2020-03-31 VITALS — RESPIRATION RATE: 18 BRPM | HEART RATE: 101 BPM | OXYGEN SATURATION: 96 %

## 2020-03-31 PROBLEM — D64.9 ANEMIA, UNSPECIFIED: Chronic | Status: ACTIVE | Noted: 2020-03-29

## 2020-03-31 PROCEDURE — 99283 EMERGENCY DEPT VISIT LOW MDM: CPT

## 2020-03-31 PROCEDURE — 71046 X-RAY EXAM CHEST 2 VIEWS: CPT

## 2020-03-31 PROCEDURE — 93005 ELECTROCARDIOGRAM TRACING: CPT

## 2020-03-31 PROCEDURE — 71046 X-RAY EXAM CHEST 2 VIEWS: CPT | Mod: 26

## 2020-03-31 PROCEDURE — 93010 ELECTROCARDIOGRAM REPORT: CPT

## 2020-03-31 PROCEDURE — 99284 EMERGENCY DEPT VISIT MOD MDM: CPT

## 2020-03-31 RX ADMIN — Medication 1 MILLIGRAM(S): at 03:52

## 2020-03-31 NOTE — ED PROVIDER NOTE - NSFOLLOWUPINSTRUCTIONS_ED_ALL_ED_FT
YOU WILL HEAR BACK WITH YOUR COVID19 RESULTS IN 1-7 DAYS.     PLEASE REST AND REMAIN HYDRATED (EG, GATORADE, PEDIALYTE, ETC).     PLEASE FOLLOW-UP WITH YOUR PCP IN 1-2 DAYS.    PLEASE RETURN TO THE EMERGENCY DEPARTMENT IF SOMNOLENCE, CHEST PAIN, SHORTNESS OF BREATH, ABDOMINAL PAIN, VOMITING, OTHER CONCERNING SYMPTOMS.

## 2020-03-31 NOTE — ED PROVIDER NOTE - PHYSICAL EXAMINATION
CONST: anxious/tearful nontoxic NAD speaking in full sentences  HEAD: atraumatic  EYES: conjunctivae clear, PERRL, EOMI  ENT: mmm  NECK: supple/FROM, nttp  CARD: rrr no murmurs  CHEST: ctab no r/r/w, no stridor/retractions/tripoding  ABD: soft, nd, nttp, no rebound/guarding  EXT: FROM, symmetric distal pulses intact  SKIN: warm, dry, no rash, no pedal edema/pain/rash, cap refill <2sec  NEURO: a+ox3, 5/5 strength x4, gross sensation intact x4, normal gait

## 2020-03-31 NOTE — ED ADULT NURSE REASSESSMENT NOTE - NS ED NURSE REASSESS COMMENT FT1
pt ambulated around entire ER with pulse ox no lower then 96%.  able to speak in complete sentences the entire time.

## 2020-03-31 NOTE — ED ADULT TRIAGE NOTE - ARRIVAL INFO ADDITIONAL COMMENTS
pt c/o sob and states she has used the albuterol inhaler given to her by another ER too much.  she feels anxious and tremulous.

## 2020-03-31 NOTE — ED PROVIDER NOTE - CLINICAL SUMMARY MEDICAL DECISION MAKING FREE TEXT BOX
avss. nontoxic. NAD. +++anxious. recent labs/ddimer/trop wnl. ekg w/o acute abnl. interim improvement on cxr. no hypoxia w/ ambulation. covid pending. pt feels safe going home -- she lives w/ her boyfriend. tolerating po. no indication for inpatient hospitalization at this time. will dc w/ outpatient pcp fu, strict return precautions. pt agrees w/ plan. questions answered.

## 2020-03-31 NOTE — ED PROVIDER NOTE - OBJECTIVE STATEMENT
24F nonsmoker, anxiety, otherwise healthy/no Rx, seen 2x in ED for similar sx w/ neg labs/trop/ddimer/rvp but +covid discharged home on albuterol prn (3/19/20, 3/29/20), now returned c/o increased anxiety/sob s/p albuterol x10 puffs in <12h. pt very anxious and states she's afraid she'll die after seeing the news. +cough/malaise. no fever/chills, no cp, no abd pain/n/v, no diarrhea, no dysuria, no rash, no etoh/ivdu.

## 2020-04-02 ENCOUNTER — EMERGENCY (EMERGENCY)
Facility: HOSPITAL | Age: 25
LOS: 1 days | Discharge: HOME | End: 2020-04-02
Attending: EMERGENCY MEDICINE | Admitting: EMERGENCY MEDICINE
Payer: COMMERCIAL

## 2020-04-02 VITALS
TEMPERATURE: 98 F | HEIGHT: 64 IN | SYSTOLIC BLOOD PRESSURE: 127 MMHG | WEIGHT: 139.99 LBS | OXYGEN SATURATION: 99 % | RESPIRATION RATE: 20 BRPM | DIASTOLIC BLOOD PRESSURE: 82 MMHG | HEART RATE: 101 BPM

## 2020-04-02 VITALS
TEMPERATURE: 98 F | DIASTOLIC BLOOD PRESSURE: 75 MMHG | SYSTOLIC BLOOD PRESSURE: 117 MMHG | RESPIRATION RATE: 18 BRPM | OXYGEN SATURATION: 100 % | HEART RATE: 90 BPM

## 2020-04-02 DIAGNOSIS — R07.89 OTHER CHEST PAIN: ICD-10-CM

## 2020-04-02 DIAGNOSIS — R50.9 FEVER, UNSPECIFIED: ICD-10-CM

## 2020-04-02 DIAGNOSIS — Z79.899 OTHER LONG TERM (CURRENT) DRUG THERAPY: ICD-10-CM

## 2020-04-02 DIAGNOSIS — U07.1 COVID-19: ICD-10-CM

## 2020-04-02 PROCEDURE — 99284 EMERGENCY DEPT VISIT MOD MDM: CPT

## 2020-04-02 NOTE — ED PROVIDER NOTE - PATIENT PORTAL LINK FT
You can access the FollowMyHealth Patient Portal offered by [facility] by registering at the following website: [email or web address]. By joining [employer]’s FollowMyHealth portal, you will also be able to view your health information using other applications (apps) compatible with our system.

## 2020-04-02 NOTE — ED ADULT TRIAGE NOTE - CHIEF COMPLAINT QUOTE
Presents to ED for SOB which she feels she is not getting any better and states chest discomfort with inspiration.

## 2020-04-02 NOTE — ED ADULT NURSE NOTE - OBJECTIVE STATEMENT
Patient c/o of feeling weak, anxious and fatigued X 10 days, diagnosed 2 days ago w/ COVID and seen in ED and sent home.  Returns today, states having chest discomfort and increased SOB, vital signs stable in triage, afebrile, O2 sat 99% on RA .

## 2020-04-02 NOTE — ED ADULT NURSE NOTE - CHPI ED NUR SYMPTOMS NEG
no fever/no body aches/no chills/no wheezing/no edema/no headache/no hemoptysis/no cough/no diaphoresis

## 2020-04-02 NOTE — ED ADULT NURSE REASSESSMENT NOTE - NS ED NURSE REASSESS COMMENT FT1
Pt walked around unit, oxygen saturation continues 100% at room air while ambulating. Pt reports that she is having shortness of breath.

## 2020-04-02 NOTE — ED PROVIDER NOTE - ATTENDING CONTRIBUTION TO CARE
23 yo female h/o anxiety, + covid c/o sob/cp.  Pt seen recently x 3 for same w nl labs, cxr other than covid.  Pt reports improved fever, diarrhea.  No ocp, le pain/swelling, h/o pe/dvt, tob use.  Well appearing, nad, nc/at, lung cta, heart reg, abd soft, nt, ext no gross deformity, no gross neuro deficits  Nl sat, no resp distress.  Suspect sx 2/2 covid; plan labs, cxr, likely dc w reassurance and recommend cont sx monitoring.

## 2020-04-02 NOTE — ED ADULT NURSE NOTE - NSIMPLEMENTINTERV_GEN_ALL_ED
Implemented All Universal Safety Interventions:  [place] to call system. Call bell, personal items and telephone within reach. Instruct patient to call for assistance. Room bathroom lighting operational. Non-slip footwear when patient is off stretcher. Physically safe environment: no spills, clutter or unnecessary equipment. Stretcher in lowest position, wheels locked, appropriate side rails in place.

## 2020-04-02 NOTE — ED PROVIDER NOTE - CLINICAL SUMMARY MEDICAL DECISION MAKING FREE TEXT BOX
24 F nonsmoker with pmh of anxiety, seen 3x in ED for similar sx w/ neg labs/trop/ddimer/rvp but +covid discharged home on albuterol prn (3/19/20, 3/29/20, 3/31) c/o still having intermittent sob and chest pain. Pain described as sharp, worse with inspiration. Also reports anosmia. Cough and diarrhea improved. No fever. Denies OCP use. Pt worried because she feels like symptoms are improving and then getting worse. VSS. Pt anxious appearing. Lungs cta b/l. O2 sat 99%, 100% with ambulation. Pt stable for dc. Discussed return precautions.

## 2020-04-02 NOTE — ED PROVIDER NOTE - OBJECTIVE STATEMENT
24 F nonsmoker with pmh of anxiety, seen 3x in ED for similar sx w/ neg labs/trop/ddimer/rvp but +covid discharged home on albuterol prn (3/19/20, 3/29/20, 3/31) c/o still having intermittent sob and chest pain. Pain described as sharp, worse with inspiration. Also reports anosmia. Cough and diarrhea improved. No fever. Denies OCP use. Pt worried because she feels like symptoms are improving and then getting worse.

## 2020-04-02 NOTE — ED PROVIDER NOTE - PHYSICAL EXAMINATION
CONSTITUTIONAL: anxious appearing, well-appearing, well-nourished; in no apparent distress.   HEAD: Normocephalic; atraumatic.   EYES: PERRL; EOM intact; conjunctiva and sclera clear  ENT: normal nose; external ears normal  NECK: Supple;   CARDIOVASCULAR: Normal S1, S2;  Regular rate and rhythm.   RESPIRATORY: Breathing easily; breath sounds clear b/l  MSK: FROM at all extremities  SKIN: Normal for age and race  NEURO: A & O x 3  PSYCHOLOGICAL: The patient’s mood and manner are appropriate.

## 2020-04-02 NOTE — ED PROVIDER NOTE - NSFOLLOWUPINSTRUCTIONS_ED_ALL_ED_FT
You have been diagnosed with COVID19 infection (the disease called by the SARS-CoV-2 virus / coronavirus). This diagnosis is a presumptive diagnosis, and has made based on what is known about the coronavirus.     RETURN TO THE EMERGENCY DEPARTMENT FOR DIFFICULTY SPEAKING IN FULL SENTENCES, FEELING SHORT OF BREATH WALKING ROOM TO ROOM AT HOME OR UPSTAIRS, OR OTHER CONCERNING SYMPTOMS.     PLEASE CONTINUE TO ISOLATE YOURSELF AT HOME FOR 14 DAYS, OR LONGER IF YOU CONTINUE TO HAVE SYMPTOMS.     YOU MAY DISCONTINUE ISOLATION WHEN:  1. AT LEAST 3 DAYS (72 HOURS) HAVE PASSED SINCE RECOVERY, WHICH IS DEFINED AS HAVING NO FEVER WITHOUT THE USE OF FEVER-REDUCING MEDICATIONS (SUCH AS TYLENOL OR MOTRIN) AND RESPIRATORY SYMPTOMS (COUGH, SHORTNESS OF BREATH), AND  2. AT LEAST 7 DAYS HAVE PASSED SINCE THE SYMPTOMS FIRST BEGAN  BOTH CONDITIONS MUST BE MET TO DISCONTINUE ISOLATION    DO NOT LEAVE HOME. DO NOT TAKE PUBLIC TRANSPORTATION. IF YOU HAVE OTHERS IN YOUR HOME REMAIN 6-10 FEET FROM THEM AND USE THE MASK AT HOME. IF YOU MUST LEAVE THE HOUSE, USE THE MASK.     Check the CDC website (cdc.gov) for updates.    General information for spread of infection:     Avoid close contact with people who are sick.  Avoid touching your eyes, nose, and mouth.  Stay home when you are sick.  Cover your cough or sneeze with a tissue, then throw the tissue in the trash.  Clean and disinfect frequently touched objects and surfaces using a regular household cleaning spray or wipe.  Follow CDC’s recommendations for using a facemask.  CDC does not recommend that people who are well wear a facemask to protect themselves from respiratory diseases, including COVID-19.  Facemasks should be used by people who show symptoms of COVID-19 to help prevent the spread of the disease to  others. The use of facemasks is also crucial for health workers and people who are taking care of someone in close settings (at home or in a health care facility).  Wash your hands often with soap and water for at least 20 seconds, especially after going to the bathroom; before eating; and after blowing your nose, coughing, or sneezing.  If soap and water are not readily available, use an alcohol-based hand  with at least 60% alcohol. Always wash hands with soap and water if hands are visibly dirty.

## 2020-04-04 ENCOUNTER — EMERGENCY (EMERGENCY)
Facility: HOSPITAL | Age: 25
LOS: 1 days | Discharge: HOME | End: 2020-04-04
Admitting: EMERGENCY MEDICINE
Payer: COMMERCIAL

## 2020-04-04 VITALS
DIASTOLIC BLOOD PRESSURE: 71 MMHG | HEART RATE: 92 BPM | TEMPERATURE: 99 F | OXYGEN SATURATION: 99 % | RESPIRATION RATE: 20 BRPM | SYSTOLIC BLOOD PRESSURE: 118 MMHG

## 2020-04-04 VITALS
DIASTOLIC BLOOD PRESSURE: 78 MMHG | TEMPERATURE: 98 F | SYSTOLIC BLOOD PRESSURE: 123 MMHG | HEART RATE: 104 BPM | HEIGHT: 64 IN | OXYGEN SATURATION: 100 % | RESPIRATION RATE: 18 BRPM

## 2020-04-04 DIAGNOSIS — F41.9 ANXIETY DISORDER, UNSPECIFIED: ICD-10-CM

## 2020-04-04 DIAGNOSIS — Z79.899 OTHER LONG TERM (CURRENT) DRUG THERAPY: ICD-10-CM

## 2020-04-04 DIAGNOSIS — R06.02 SHORTNESS OF BREATH: ICD-10-CM

## 2020-04-04 DIAGNOSIS — R05 COUGH: ICD-10-CM

## 2020-04-04 DIAGNOSIS — Z86.39 PERSONAL HISTORY OF OTHER ENDOCRINE, NUTRITIONAL AND METABOLIC DISEASE: ICD-10-CM

## 2020-04-04 DIAGNOSIS — B97.29 OTHER CORONAVIRUS AS THE CAUSE OF DISEASES CLASSIFIED ELSEWHERE: ICD-10-CM

## 2020-04-04 DIAGNOSIS — M79.18 MYALGIA, OTHER SITE: ICD-10-CM

## 2020-04-04 DIAGNOSIS — Z87.19 PERSONAL HISTORY OF OTHER DISEASES OF THE DIGESTIVE SYSTEM: ICD-10-CM

## 2020-04-04 DIAGNOSIS — Z79.51 LONG TERM (CURRENT) USE OF INHALED STEROIDS: ICD-10-CM

## 2020-04-04 PROCEDURE — 99283 EMERGENCY DEPT VISIT LOW MDM: CPT

## 2020-04-04 PROCEDURE — 71045 X-RAY EXAM CHEST 1 VIEW: CPT

## 2020-04-04 PROCEDURE — 99284 EMERGENCY DEPT VISIT MOD MDM: CPT | Mod: 25

## 2020-04-04 PROCEDURE — 71045 X-RAY EXAM CHEST 1 VIEW: CPT | Mod: 26

## 2020-04-04 RX ORDER — ONDANSETRON 8 MG/1
4 TABLET, FILM COATED ORAL ONCE
Refills: 0 | Status: COMPLETED | OUTPATIENT
Start: 2020-04-04 | End: 2020-04-04

## 2020-04-04 RX ADMIN — ONDANSETRON 4 MILLIGRAM(S): 8 TABLET, FILM COATED ORAL at 10:59

## 2020-04-04 NOTE — ED PROVIDER NOTE - CLINICAL SUMMARY MEDICAL DECISION MAKING FREE TEXT BOX
24 year old female non-smoker, history of anxiety, positive covid19 presenting to the ed with concern for SOB. states symptoms for 2 weeks and has been presenting to the ed for repeat "chest xray and to check my pulse ox". pt states anxiety contributing and perhaps making worse. pe patient appears well, non-toxic. anxious appears. o2 100% RA as well as with ambulation. lungs cta. discussed with patient regarding stress/anxiety relieving exercises. take tylenol as needed for fevers. At this time, the evidence for any other entities in the differential is insufficient to justify any further testing. This was discussed and explained to the patient. It was advised that persistent or worsening symptoms would require further evaluation. This was discussed with the patient and family using shared decision making. ED evaluation and management discussed with the patient and family (if available) in detail.  Close PMD follow up encouraged.  Strict ED return instructions discussed in detail and patient given the opportunity to ask any questions about their discharge diagnosis and instructions. Patient verbalized understanding. Patient is agreeable to plan. 24 year old female non-smoker, history of anxiety, positive covid19 presenting to the ed with concern for SOB. states symptoms for 2 weeks and has been presenting to the ed for repeat "chest xray and to check my pulse ox". pt states anxiety contributing and perhaps making worse. pe patient appears well, non-toxic. anxious appears. o2 100% RA as well as with ambulation. lungs cta. cxr unchanged from prior. discussed with patient regarding stress/anxiety relieving exercises. take tylenol as needed for fevers. At this time, the evidence for any other entities in the differential is insufficient to justify any further testing. This was discussed and explained to the patient. It was advised that persistent or worsening symptoms would require further evaluation. This was discussed with the patient and family using shared decision making. ED evaluation and management discussed with the patient and family (if available) in detail.  Close PMD follow up encouraged.  Strict ED return instructions discussed in detail and patient given the opportunity to ask any questions about their discharge diagnosis and instructions. Patient verbalized understanding. Patient is agreeable to plan.

## 2020-04-04 NOTE — ED ADULT NURSE NOTE - OBJECTIVE STATEMENT
Pt presents to ER with c/o SOB after testing positive for COVID-19 a few days ago. Pt states her "lungs are numb" and is unable to feel air going into lungs. Pt has visited ER multiple times over past few weeks due to SOB and anxiety over virus. Pt ambulated around unit with pulse ox and was found to be 99% on room air. C/o chills, SOB x2 weeks.

## 2020-04-04 NOTE — ED PROVIDER NOTE - DIAGNOSTIC INTERPRETATION
ER PA: Caryl Viera  CHEST XRAY INTERPRETATION: lungs clear, heart shadow normal, bony structures intact

## 2020-04-04 NOTE — ED PROVIDER NOTE - OBJECTIVE STATEMENT
stated diagnosed with coronavirus 2 weeks ago. states that she is having continue concern about shortness of breath. states that she is worried and unable to concentrate about anything else. states that she is worried "because I don't have a pulse ox at home and don't know my oxygen". states that she is able to watch tv and then worries with concern about worsening due to the coronavirus. took xanax "but I thought it made my breathing slower". does not endorse fevers chills chest pain palpitations cough, nausea vomiting diarrhea or abdominal pain. states that this is appx her 10th er visit amongst various hospitals for evaluation. states "they usually check my pulse ox and chest xray and send me home". Pt worried because she feels like symptoms are improving and then getting worse.    of note, pt seen 4x in ED in this ed similar sx w/ neg labs/trop/ddimer/rvp but +covid discharged home on albuterol prn (3/19/20, 3/29/20, 3/31) c/o still having intermittent sob and chest pain. Pain described as sharp, worse with inspiration. Also reports anosmia. Cough and diarrhea improved. No fever. Denies OCP use.

## 2020-04-04 NOTE — ED PROVIDER NOTE - NSFOLLOWUPINSTRUCTIONS_ED_ALL_ED_FT
pleaase continue to take medication as prescribed    please look in to PMR (Progressive muscle relaxation) or joyable to help with the thoughts you have been expereincing    please come back to the emergency room for any worsening of symptoms    A viral respiratory infection is an illness that affects parts of the body used for breathing, like the lungs, nose, and throat. It is caused by a germ called a virus. Symptoms can include runny nose, coughing, sneezing, fatigue, body aches, sore throat, fever, or headache. Over the counter medicine can be used to manage the symptoms but the infection typically goes away on its own in 5 to 10 days.     SEEK IMMEDIATE MEDICAL CARE IF YOU HAVE ANY OF THE FOLLOWING SYMPTOMS: shortness of breath, chest pain, fever over 10 days, or lightheadedness/dizziness. please continue to take medication as prescribed    please look in to PMR (Progressive muscle relaxation) to help with the thoughts you have been experiencing    great apps for stress and anxiety include joyable as well as headspace    please come back to the emergency room for any worsening of symptoms    A viral respiratory infection is an illness that affects parts of the body used for breathing, like the lungs, nose, and throat. It is caused by a germ called a virus. Symptoms can include runny nose, coughing, sneezing, fatigue, body aches, sore throat, fever, or headache. Over the counter medicine can be used to manage the symptoms but the infection typically goes away on its own in 5 to 10 days.     SEEK IMMEDIATE MEDICAL CARE IF YOU HAVE ANY OF THE FOLLOWING SYMPTOMS: shortness of breath, chest pain, fever over 10 days, or lightheadedness/dizziness.

## 2020-04-05 ENCOUNTER — EMERGENCY (EMERGENCY)
Facility: HOSPITAL | Age: 25
LOS: 1 days | Discharge: HOME | End: 2020-04-05
Attending: EMERGENCY MEDICINE | Admitting: EMERGENCY MEDICINE
Payer: COMMERCIAL

## 2020-04-05 VITALS
WEIGHT: 139.99 LBS | HEIGHT: 64 IN | OXYGEN SATURATION: 92 % | SYSTOLIC BLOOD PRESSURE: 131 MMHG | TEMPERATURE: 98 F | HEART RATE: 110 BPM | RESPIRATION RATE: 26 BRPM | DIASTOLIC BLOOD PRESSURE: 85 MMHG

## 2020-04-05 PROCEDURE — 99284 EMERGENCY DEPT VISIT MOD MDM: CPT

## 2020-04-05 PROCEDURE — 99283 EMERGENCY DEPT VISIT LOW MDM: CPT

## 2020-04-05 NOTE — ED PROVIDER NOTE - PATIENT PORTAL LINK FT
You can access the FollowMyHealth Patient Portal offered by [facility] by registering at the following website: [email or web address]. By joining [employer]’s FollowMyHealth portal, you will also be able to view your health information using other applications (apps) compatible with our system.

## 2020-04-05 NOTE — ED PROVIDER NOTE - CLINICAL SUMMARY MEDICAL DECISION MAKING FREE TEXT BOX
avss. nontoxic. NAD. no active cp. no acute resp distress. after evaluation, pt requesting to go home. pt w/ full capacity. no e/o dehydration. no indication for inpatient hospitalization at this time. will dc w/ outpatient pcp fu, strict return precautions. pt agrees w/ plan. questions answered.

## 2020-04-05 NOTE — ED ADULT TRIAGE NOTE - NS ED TRIAGE AVPU SCALE
Left a voice message that the Atarax is approved Alert-The patient is alert, awake and responds to voice. The patient is oriented to time, place, and person. The triage nurse is able to obtain subjective information.

## 2020-04-05 NOTE — ED ADULT TRIAGE NOTE - ARRIVAL INFO ADDITIONAL COMMENTS
Patient reports that she is COVID positive. Patient stated increasing shortness of breath and a palpitation sensation.

## 2020-04-05 NOTE — ED PROVIDER NOTE - NS ED ATTENDING STATEMENT MOD
Documentation of the ultasound findings, images, and interpretations with be available in the patient's Viewpoint report which is located in the imaging tab in chart review.   Attending Only

## 2020-04-05 NOTE — ED PROVIDER NOTE - PROGRESS NOTE DETAILS
during evaluation pt suddenly states she wants to go home and thinks she's just anxious. offered repeat cxr however pt deferring at this time. pt w/ full capacity. questions answered. strict return precautions.

## 2020-04-05 NOTE — ED PROVIDER NOTE - NSFOLLOWUPINSTRUCTIONS_ED_ALL_ED_FT
PLEASE REST AND REMAIN HYDRATED (EG, GATORADE, PEDIALYTE, ETC). TYLENOL AS NEEDED FOR FEVER (>100.4F/>38C).     PLEASE FOLLOW-UP WITH YOUR PCP IN 1-2 DAYS.    PLEASE RETURN TO THE EMERGENCY DEPARTMENT IF SOMNOLENCE, CHEST PAIN, SHORTNESS OF BREATH, ABDOMINAL PAIN, VOMITING, OTHER CONCERNING SYMPTOMS.

## 2020-04-05 NOTE — ED PROVIDER NOTE - OBJECTIVE STATEMENT
24F nonsmoker, anxiety, otherwise healthy/no Rx, seen 5x in ED for similar sx w/ neg labs/trop/ddimer/rvp/cxr but +covid discharged home on albuterol prn (3/19/20, 3/29/20, 3/31/20, 4/2/20, 4/4/20), now returned c/o increased anxiety/exertional sob. pt very anxious and again states she's afraid she'll die after seeing the news. +cough/malaise. no fever/chills, no cp, no abd pain/n/v, no diarrhea, no dysuria, no rash, no etoh/ivdu.

## 2020-04-08 DIAGNOSIS — Z79.899 OTHER LONG TERM (CURRENT) DRUG THERAPY: ICD-10-CM

## 2020-04-08 DIAGNOSIS — R06.02 SHORTNESS OF BREATH: ICD-10-CM

## 2020-04-08 DIAGNOSIS — B34.2 CORONAVIRUS INFECTION, UNSPECIFIED: ICD-10-CM

## 2020-04-08 DIAGNOSIS — Z79.51 LONG TERM (CURRENT) USE OF INHALED STEROIDS: ICD-10-CM

## 2020-04-09 DIAGNOSIS — R05 COUGH: ICD-10-CM

## 2020-04-09 DIAGNOSIS — Z79.51 LONG TERM (CURRENT) USE OF INHALED STEROIDS: ICD-10-CM

## 2020-04-09 DIAGNOSIS — B34.2 CORONAVIRUS INFECTION, UNSPECIFIED: ICD-10-CM

## 2020-04-10 ENCOUNTER — EMERGENCY (EMERGENCY)
Facility: HOSPITAL | Age: 25
LOS: 1 days | Discharge: HOME | End: 2020-04-10
Attending: EMERGENCY MEDICINE | Admitting: EMERGENCY MEDICINE
Payer: COMMERCIAL

## 2020-04-10 VITALS
WEIGHT: 136.03 LBS | SYSTOLIC BLOOD PRESSURE: 126 MMHG | HEIGHT: 64 IN | OXYGEN SATURATION: 100 % | HEART RATE: 100 BPM | DIASTOLIC BLOOD PRESSURE: 71 MMHG | TEMPERATURE: 98 F | RESPIRATION RATE: 16 BRPM

## 2020-04-10 PROCEDURE — 99284 EMERGENCY DEPT VISIT MOD MDM: CPT

## 2020-04-10 PROCEDURE — 99282 EMERGENCY DEPT VISIT SF MDM: CPT

## 2020-04-10 NOTE — ED PROVIDER NOTE - OBJECTIVE STATEMENT
24 F pmh anxiety, covid + from 3/29 w/ 6 [facility] ED visits w/ neg labs/trop/ddimer/rvp/cx and outside ED visits since returns c/o generalized fatigue and "nerve pain all over my body,"  requesting neuro evaluation.  Pt reports her cough/sob have improved but still feeling generalized fatigue/feeling tired/weak and shooting tingling in her body, stating "I cant feel my body."  Pt reports increased anxiety and concerned she isnt going to get better.  Denies fever, chills, headache, dizziness, fainting, chest pain, abd pain, nvd, weakness in extremities, dysarthria/dysphagia, difficulty ambulating, bowel/bladder dysfunction, trauma 24 F pmh anxiety, covid + from 3/29 w/ 6 [facility] ED visits w/ neg labs/trop/ddimer/rvp/cx and outside ED visits since returns c/o generalized fatigue and "tingling sensation all over my body,"  requesting evaluation of her brain to make sure virus didn't spread.  Pt reports her cough/sob have improved but still feeling generalized fatigue/feeling tired/weak and shooting tingling in her body; NO focal weakness in extremities.  Pt reports increased anxiety and concerned she isnt going to get better.  Denies fever, chills, headache, dizziness, fainting, chest pain, abd pain, nvd, dysarthria/dysphagia, difficulty ambulating, bowel/bladder dysfunction, trauma, use of etoh/illicits, SI/HI.

## 2020-04-10 NOTE — ED ADULT NURSE NOTE - CHPI ED NUR SYMPTOMS NEG
no abdominal pain/no chills/no fever/no headache/no vomiting/no rash/no shortness of breath/no cough/no diarrhea

## 2020-04-10 NOTE — ED PROVIDER NOTE - NSFOLLOWUPINSTRUCTIONS_ED_ALL_ED_FT
You have covid19 and you need to continue isolating yourself at home and stay hydrated.  Please make telemedicine appointment with primary care clinic within 2-3 days and contact your psychiatrist as many of your symptoms are related to anxiety.      PLEASE REST AND REMAIN HYDRATED (EG, GATORADE, PEDIALYTE, ETC). TYLENOL AS NEEDED FOR FEVER (>100.4F/>38C)  PLEASE FOLLOW-UP WITH YOUR PCP IN 1-2 DAYS    PLEASE RETURN TO THE EMERGENCY DEPARTMENT IF SOMNOLENCE, CHEST PAIN, SHORTNESS OF BREATH, ABDOMINAL PAIN, VOMITING, OTHER CONCERNING SYMPTOMS

## 2020-04-10 NOTE — ED PROVIDER NOTE - NSFOLLOWUPCLINICS_GEN_ALL_ED_FT
[facility]  Family Medicine  [facility]  [place], [place] [facility]  Phone: [phone]  Fax:   Follow Up Time:

## 2020-04-10 NOTE — ED ADULT NURSE NOTE - OBJECTIVE STATEMENT
+COVID march 29.  Pt c/o of generalized weakness [id number] throughout body.  States "I can't feel my entire body for four weeks". Endorses decrease in appetite. Ambulating w steady gait, no headaches, blurred vision or slurred speech. Denies SOB.

## 2020-04-10 NOTE — ED PROVIDER NOTE - PHYSICAL EXAMINATION
Vitals reviewed  Gen: anxious appearing, nad, speaking in full sentences- no dyspnea/increased wob  Skin: wwp, no rash/lesions  HEENT: ncat, eomi, mmm  CV: rrr, no audible m/r/g  Resp: symmetrical expansion, ctab, no w/r/r  Abd: nondistended, soft/nt  Ext: FROM throughout, 5/5 strength in all ext, distal/proximal sensation equal in all ext but subjectively reports "feels light" when sensation assessed, no peripheral edema  Neuro: alert/oriented, no focal deficits, steady gait, normal finger to nose, no ataxia, normal cerebellar function Vitals reviewed  Gen: anxious appearing, nad, speaking in full sentences- no dyspnea/increased wob  Skin: wwp, no rash/lesions  HEENT: ncat, eomi, mmm  CV: rrr, no audible m/r/g  Resp: symmetrical expansion, ctab, no w/r/r  Abd: nondistended, soft/nt  Ext: FROM throughout, 5/5 strength in all ext, distal/proximal sensation equal in all ext, no peripheral edema  Neuro: alert/oriented, no focal deficits, steady gait, normal finger to nose, no ataxia, normal cerebellar function

## 2020-04-10 NOTE — ED PROVIDER NOTE - CLINICAL SUMMARY MEDICAL DECISION MAKING FREE TEXT BOX
24 F pmh anxiety, covid + from 3/29 w/ 6 [facility] ED visits w/ neg labs/trop/ddimer/rvp/cx and outside ED visits since returns c/o generalized fatigue and "nerve pain all over my body."  appears anxious but no resp distress, 5/5 strength in all ext- NO weakness and sensation is equal in all ext, subjectively reports "feels light."  pt reassured and instructed to continue PO hydration and rest.  given PMD referral and d/c in stable condition.  discussed strict return parameters.  d/w attending 24 F pmh anxiety, covid + from 3/29 w/ 6 [facility] ED visits w/ neg labs/trop/ddimer/rvp/cx and outside ED visits since returns c/o generalized fatigue and "tingling sensation all over my body."  appears anxious but no resp distress, 5/5 strength in all ext- NO weakness and sensation is equal in all ext. pt reassured and instructed to continue PO hydration and rest.  given PMD referral and d/c in stable condition.  discussed strict return parameters.  d/w attending

## 2020-04-10 NOTE — ED PROVIDER NOTE - PATIENT PORTAL LINK FT
You can access the FollowMyHealth Patient Portal offered by [facility] by registering at the following website: [email or web address]. By joining [employer]’s FollowMyHealth portal, you will also be able to view your health information using other applications (apps) compatible with our system.

## 2020-04-10 NOTE — ED ADULT NURSE NOTE - CHIEF COMPLAINT QUOTE
+COVID march 29.  Pt c/o of generalized weakness [id number] throughout body.  States "I can't feel my entire body for four weeks".  Ambulating w steady gait, no headaches, blurred vision or slurred speech.  Multiple ID bracelets noted on arms from different hospitals.  No SOB.

## 2020-04-10 NOTE — ED PROVIDER NOTE - ATTENDING CONTRIBUTION TO CARE
25 yo F with PMH of anxiety, covid + from 3/29 w/ 6 [facility] ED visits w/ neg labs/trop/ddimer/rvp/cx and outside ED visits since returns c/o generalized fatigue and "tingling sensation all over my body,"  requesting evaluation of her brain to make sure virus didn't spread. Reports her cough/sob have improved but still feeling generalized fatigue. No focal weakness in extremities. Pt reports increased anxiety and concern about her Covid dx.  Denies fever, chills, headache, dizziness, fainting, chest pain, abd pain, nvd, dysarthria/dysphagia, difficulty ambulating, bowel/bladder dysfunction, trauma, use of etoh/illicits, SI/HI. Pt AAO, NAD. non-toxic appearing, ambulating easily in ED with neuro exam grossly normal. Pt reassured and instructed to continue PO hydration and rest. HD stable and with normla O2 sats. Stable for dc and outpt f/up.

## 2020-04-10 NOTE — ED ADULT TRIAGE NOTE - CHIEF COMPLAINT QUOTE
+COVID march 29.  Pt c/o of generalized weakness [id number] throughout body.  States "I can't feel my entire body for four weeks".  Ambulating w steady gait, no headaches, blurred vision or slurred speech.  Multiple ID bracelets noted on arms from different hospitals.  No SOB.

## 2020-04-13 ENCOUNTER — EMERGENCY (EMERGENCY)
Facility: HOSPITAL | Age: 25
LOS: 1 days | Discharge: HOME | End: 2020-04-13
Admitting: EMERGENCY MEDICINE
Payer: COMMERCIAL

## 2020-04-13 VITALS
OXYGEN SATURATION: 99 % | SYSTOLIC BLOOD PRESSURE: 120 MMHG | RESPIRATION RATE: 18 BRPM | HEART RATE: 78 BPM | DIASTOLIC BLOOD PRESSURE: 83 MMHG | TEMPERATURE: 98 F

## 2020-04-13 VITALS
DIASTOLIC BLOOD PRESSURE: 80 MMHG | HEART RATE: 71 BPM | WEIGHT: 136.03 LBS | HEIGHT: 64 IN | RESPIRATION RATE: 18 BRPM | TEMPERATURE: 98 F | SYSTOLIC BLOOD PRESSURE: 119 MMHG | OXYGEN SATURATION: 99 %

## 2020-04-13 PROBLEM — Z00.00 ENCOUNTER FOR PREVENTIVE HEALTH EXAMINATION: Status: ACTIVE | Noted: 2020-04-13

## 2020-04-13 PROBLEM — F41.9 ANXIETY DISORDER, UNSPECIFIED: Chronic | Status: ACTIVE | Noted: 2020-04-10

## 2020-04-13 LAB
ALBUMIN SERPL ELPH-MCNC: 4.7 G/DL — SIGNIFICANT CHANGE UP (ref 3.3–5)
ALP SERPL-CCNC: 49 U/L — SIGNIFICANT CHANGE UP (ref 40–120)
ALT FLD-CCNC: 17 U/L — SIGNIFICANT CHANGE UP (ref 10–45)
ANION GAP SERPL CALC-SCNC: 11 MMOL/L — SIGNIFICANT CHANGE UP (ref 5–17)
APPEARANCE UR: CLEAR — SIGNIFICANT CHANGE UP
AST SERPL-CCNC: 19 U/L — SIGNIFICANT CHANGE UP (ref 10–40)
BASOPHILS # BLD AUTO: 0.02 K/UL — SIGNIFICANT CHANGE UP (ref 0–0.2)
BASOPHILS NFR BLD AUTO: 0.5 % — SIGNIFICANT CHANGE UP (ref 0–2)
BILIRUB SERPL-MCNC: 0.5 MG/DL — SIGNIFICANT CHANGE UP (ref 0.2–1.2)
BILIRUB UR-MCNC: NEGATIVE — SIGNIFICANT CHANGE UP
BUN SERPL-MCNC: 4 MG/DL — LOW (ref 7–23)
CALCIUM SERPL-MCNC: 10.2 MG/DL — SIGNIFICANT CHANGE UP (ref 8.4–10.5)
CHLORIDE SERPL-SCNC: 105 MMOL/L — SIGNIFICANT CHANGE UP (ref 96–108)
CO2 SERPL-SCNC: 25 MMOL/L — SIGNIFICANT CHANGE UP (ref 22–31)
COLOR SPEC: SIGNIFICANT CHANGE UP
CREAT SERPL-MCNC: 0.76 MG/DL — SIGNIFICANT CHANGE UP (ref 0.5–1.3)
DIFF PNL FLD: NEGATIVE — SIGNIFICANT CHANGE UP
EOSINOPHIL # BLD AUTO: 0.06 K/UL — SIGNIFICANT CHANGE UP (ref 0–0.5)
EOSINOPHIL NFR BLD AUTO: 1.4 % — SIGNIFICANT CHANGE UP (ref 0–6)
GLUCOSE SERPL-MCNC: 100 MG/DL — HIGH (ref 70–99)
GLUCOSE UR QL: NEGATIVE — SIGNIFICANT CHANGE UP
HCT VFR BLD CALC: 42.9 % — SIGNIFICANT CHANGE UP (ref 34.5–45)
HGB BLD-MCNC: 14.4 G/DL — SIGNIFICANT CHANGE UP (ref 11.5–15.5)
IMM GRANULOCYTES NFR BLD AUTO: 0.2 % — SIGNIFICANT CHANGE UP (ref 0–1.5)
KETONES UR-MCNC: NEGATIVE — SIGNIFICANT CHANGE UP
LEUKOCYTE ESTERASE UR-ACNC: NEGATIVE — SIGNIFICANT CHANGE UP
LYMPHOCYTES # BLD AUTO: 1.5 K/UL — SIGNIFICANT CHANGE UP (ref 1–3.3)
LYMPHOCYTES # BLD AUTO: 35.4 % — SIGNIFICANT CHANGE UP (ref 13–44)
MCHC RBC-ENTMCNC: 28.2 PG — SIGNIFICANT CHANGE UP (ref 27–34)
MCHC RBC-ENTMCNC: 33.6 GM/DL — SIGNIFICANT CHANGE UP (ref 32–36)
MCV RBC AUTO: 84.1 FL — SIGNIFICANT CHANGE UP (ref 80–100)
MONOCYTES # BLD AUTO: 0.33 K/UL — SIGNIFICANT CHANGE UP (ref 0–0.9)
MONOCYTES NFR BLD AUTO: 7.8 % — SIGNIFICANT CHANGE UP (ref 2–14)
NEUTROPHILS # BLD AUTO: 2.32 K/UL — SIGNIFICANT CHANGE UP (ref 1.8–7.4)
NEUTROPHILS NFR BLD AUTO: 54.7 % — SIGNIFICANT CHANGE UP (ref 43–77)
NITRITE UR-MCNC: NEGATIVE — SIGNIFICANT CHANGE UP
NRBC # BLD: 0 /100 WBCS — SIGNIFICANT CHANGE UP (ref 0–0)
PH UR: 6.5 — SIGNIFICANT CHANGE UP (ref 5–8)
PLATELET # BLD AUTO: 277 K/UL — SIGNIFICANT CHANGE UP (ref 150–400)
POTASSIUM SERPL-MCNC: 4.3 MMOL/L — SIGNIFICANT CHANGE UP (ref 3.5–5.3)
POTASSIUM SERPL-SCNC: 4.3 MMOL/L — SIGNIFICANT CHANGE UP (ref 3.5–5.3)
PROT SERPL-MCNC: 7.6 G/DL — SIGNIFICANT CHANGE UP (ref 6–8.3)
PROT UR-MCNC: NEGATIVE MG/DL — SIGNIFICANT CHANGE UP
RBC # BLD: 5.1 M/UL — SIGNIFICANT CHANGE UP (ref 3.8–5.2)
RBC # FLD: 12.8 % — SIGNIFICANT CHANGE UP (ref 10.3–14.5)
SODIUM SERPL-SCNC: 141 MMOL/L — SIGNIFICANT CHANGE UP (ref 135–145)
SP GR SPEC: <=1.005 — SIGNIFICANT CHANGE UP (ref 1–1.03)
UROBILINOGEN FLD QL: 0.2 E.U./DL — SIGNIFICANT CHANGE UP
WBC # BLD: 4.24 K/UL — SIGNIFICANT CHANGE UP (ref 3.8–10.5)
WBC # FLD AUTO: 4.24 K/UL — SIGNIFICANT CHANGE UP (ref 3.8–10.5)

## 2020-04-13 PROCEDURE — 70450 CT HEAD/BRAIN W/O DYE: CPT

## 2020-04-13 PROCEDURE — 85025 COMPLETE CBC W/AUTO DIFF WBC: CPT

## 2020-04-13 PROCEDURE — 81003 URINALYSIS AUTO W/O SCOPE: CPT

## 2020-04-13 PROCEDURE — 70450 CT HEAD/BRAIN W/O DYE: CPT | Mod: 26

## 2020-04-13 PROCEDURE — 99285 EMERGENCY DEPT VISIT HI MDM: CPT

## 2020-04-13 PROCEDURE — 36415 COLL VENOUS BLD VENIPUNCTURE: CPT

## 2020-04-13 PROCEDURE — 99284 EMERGENCY DEPT VISIT MOD MDM: CPT | Mod: 25

## 2020-04-13 PROCEDURE — 96360 HYDRATION IV INFUSION INIT: CPT

## 2020-04-13 PROCEDURE — 80053 COMPREHEN METABOLIC PANEL: CPT

## 2020-04-13 RX ORDER — SODIUM CHLORIDE 9 MG/ML
1000 INJECTION INTRAMUSCULAR; INTRAVENOUS; SUBCUTANEOUS ONCE
Refills: 0 | Status: COMPLETED | OUTPATIENT
Start: 2020-04-13 | End: 2020-04-13

## 2020-04-13 RX ADMIN — SODIUM CHLORIDE 1000 MILLILITER(S): 9 INJECTION INTRAMUSCULAR; INTRAVENOUS; SUBCUTANEOUS at 16:53

## 2020-04-13 RX ADMIN — SODIUM CHLORIDE 1000 MILLILITER(S): 9 INJECTION INTRAMUSCULAR; INTRAVENOUS; SUBCUTANEOUS at 15:40

## 2020-04-13 NOTE — ED PROVIDER NOTE - CLINICAL SUMMARY MEDICAL DECISION MAKING FREE TEXT BOX
headache and nerve pain with dx of COVID 3 wks ago. pt well appearing. visual acuity intact. neuro exam intact.  VSS. labs noted. ct head no acute pathology.  suspect sx's related to COVID infection. appt arranged with neuro tomorrow. f/u with pmd. return precautions d/w pt.

## 2020-04-13 NOTE — ED PROVIDER NOTE - NSFOLLOWUPINSTRUCTIONS_ED_ALL_ED_FT
Follow up with neurology tomorrow. Follow up with your primary care physician in 2-3 days. please self isolate at home.             COVID-19 (Coronavirus Disease 2019)    WHAT YOU NEED TO KNOW:    COVID-19 is the disease caused by the 2019 novel (new) coronavirus. It was first found in individuals in a part of [place] in late 2019. The virus is spreading to other countries as infected persons travel. Coronaviruses generally cause respiratory (nose, throat, and lung) infections, such as a cold. They can also cause serious infections such as Middle East respiratory syndrome (MERS) and severe acute respiratory syndrome (SARS). The new virus is related to the SARS coronavirus, so it is officially named SARS coronavirus 2 (SARS-CoV-2).    DISCHARGE INSTRUCTIONS:    If you think you or someone you know may be infected: It is important for anyone who may be infected to get tested right away. Do the following to protect others:     If emergency care is needed, tell the  about the possible infection, or call ahead and tell the emergency department.      Call a healthcare provider to be seen in the office. Anyone who may be infected should not arrive without calling first. The provider will need to protect staff members and other patients.      The person who may be infected needs to wear a medical mask before getting medical care. The mask needs to stay on until the provider says to remove it.    Call your local emergency number (911 in the [place]) or go to the emergency department if: You have signs or symptoms of COVID-19, and any of the following is true:     You traveled within the last 14 days to an area where the virus is active or had close contact with someone who did.      You had close contact within the last 14 days with someone who has a confirmed infection.    Call your doctor if: You do not have signs or symptoms of COVID-19, but any of the following is true:     You traveled within the last 14 days to an area where the virus is active or had close contact with someone who did.      You had close contact within the last 14 days with someone who has a confirmed infection.      You have questions or concerns about your condition or care.    Medicines: You may need any of the following for mild symptoms:     Decongestants help reduce nasal congestion and help you breathe more easily. If you take decongestant pills, they may make you feel restless or cause problems with your sleep. Do not use decongestant sprays for more than a few days.      Cough suppressants help reduce coughing. Ask your healthcare provider which type of cough medicine is best for you.      Acetaminophen decreases pain and fever. It is available without a doctor's order. Ask how much to take and how often to take it. Follow directions. Read the labels of all other medicines you are using to see if they also contain acetaminophen, or ask your doctor or pharmacist. Acetaminophen can cause liver damage if not taken correctly. Do not use more than 4 grams (4,000 milligrams) total of acetaminophen in one day.       NSAIDs, such as ibuprofen, help decrease swelling, pain, and fever. NSAIDs can cause stomach bleeding or kidney problems in certain people. If you take blood thinner medicine, always ask your healthcare provider if NSAIDs are safe for you. Always read the medicine label and follow directions.      Take your medicine as directed. Contact your healthcare provider if you think your medicine is not helping or if you have side effects. Tell him or her if you are allergic to any medicine. Keep a list of the medicines, vitamins, and herbs you take. Include the amounts, and when and why you take them. Bring the list or the pill bottles to follow-up visits. Carry your medicine list with you in case of an emergency.    How the 2019 coronavirus spreads: The virus appears to spread quickly and easily. The following are ways the virus is thought to spread, but more information may be coming:     Droplets are the most common way all coronaviruses spread. The virus can travel in droplets that form when a person talks, coughs, or sneezes. Anyone who breathes in the virus or gets it in his or her eyes can become infected.      An infected person may be able to leave the virus on objects and surfaces. Another person can get the virus on his or her hands by touching the object or surface. Infection happens if the person then touches his or her eyes or mouth with unwashed hands. It is not yet known how long the virus can stay on an object or surface. Evidence shows it may be as long as 9 days at room temperature.      Person-to-person contact may spread the virus. For example, an infected person can spread the virus by shaking hands with someone. At this time, it does not appear that the virus can be passed to a baby during pregnancy or delivery. The virus also does not appear to spread during breastfeeding. If you are pregnant or breastfeeding, talk to your healthcare provider or obstetrician about any concerns you have.      An infected animal may be able to infect a person who touches it. This may happen at live markets or on a farm.    Prevent a 2019 coronavirus infection: Everyone should do the following to prevent getting or spreading the virus:     Wash your hands often. Use soap and water every time you wash your hands. Rub your soapy hands together, lacing your fingers. Use the fingers of one hand to scrub under the nails of the other hand. Wash for at least 20 seconds. Rinse with warm, running water for several seconds. Then dry your hands. Use germ-killing gel if soap and water are not available. Do not touch your eyes or mouth without washing your hands first. Handwashing           Cover a sneeze or cough. Use a tissue that covers your mouth and nose. Throw the tissue away in a trash can right away. Use the bend of your arm if a tissue is not available. Wash your hands well with soap and water or use a hand . Do not stand close to anyone who is sneezing or coughing.      Be careful around others. The best way to prevent infection is to avoid anyone who is infected, but this may be difficult. An infected person may be able to spread the virus before signs or symptoms begin. Until more is known, you may not want to shake hands with others. If you do shake hands, wash your hands or use hand  as soon as possible. You do not need to wear a medical mask if you are well and not caring for an infected person.      Ask about vaccines you may need. No vaccine is available for the new coronavirus. But any infection can affect your immune system. A weakened immune system makes you more vulnerable to the new coronavirus. Until a vaccine against the new virus is developed, do the following:   Get a flu vaccine as soon as recommended each year. The flu vaccine is available starting in September or October. Flu viruses change, so it is important to get a flu vaccine every year.      Talk to your healthcare provider about your vaccine history. Tell him or her if you did not get certain vaccines as a child, or you did not get all recommended doses. Tell him or her if you do not know your vaccine history. He or she will tell you which vaccines you need, and when to get them.           If you have COVID-19: Healthcare providers will give you specific instructions to follow. The following are general guidelines to remind you of how to keep others safe until you are well:     Limit close contact with others. Your healthcare provider will tell you when it is okay to be around others. This may be when you do not have a fever, do not take fever medicine, and have no symptoms. Fluid from your respiratory tract will need to test negative for the virus 2 times at least 24 hours apart. Until then, do the following along with any instructions from your provider:   Only go out of the house for medical appointments. Always call the provider’s office first so he or she can prepare to keep others safe.      Stay at least 6 feet (2 meters) away from others.      Sleep in a different room from others in the house.      Do not shake hands with other people.      Wear a medical mask when others are near you. This can help prevent droplets from spreading the virus when you talk, sneeze, or cough.      Do not share items. Do not share dishes, towels, or other items with anyone. Items need to be washed after you use them.      Do not handle live animals. The virus may be spread to animals, including pets. Until more is known, it is best not to touch, play with, or handle live animals.    Self-care:     Drink more liquids as directed. Liquids will help thin and loosen mucus so you can cough it up. Liquids will also help prevent dehydration. Liquids that help prevent dehydration include water, fruit juice, and broth. Do not drink liquids that contain caffeine. Caffeine can increase your risk for dehydration. Ask your healthcare provider how much liquid to drink each day.      Soothe a sore throat. Gargle with warm salt water. This may help your sore throat feel better. Make salt water by dissolving ¼ teaspoon salt in 1 cup warm water. You may also suck on hard candy or throat lozenges. You may use a sore throat spray.      Use a humidifier or vaporizer. Use a cool mist humidifier or a vaporizer to increase air moisture in your home. This may make it easier for you to breathe and help decrease your cough.      Use saline nasal drops as directed. These help relieve congestion.      Apply petroleum-based jelly around the outside of your nostrils. This can decrease irritation from blowing your nose.      Do not smoke. Nicotine and other chemicals in cigarettes and cigars can make your symptoms worse. They can also cause infections such as bronchitis or pneumonia. Ask your healthcare provider for information if you currently smoke and need help to quit. E-cigarettes or smokeless tobacco still contain nicotine. Talk to your healthcare provider before you use these products.    If you take care of someone who has COVID-19:     Wear a medical mask when you are near the person. This can help protect you from droplets that carry the virus when the person talks, sneezes, or coughs.      Do not allow others to go near the person. No one should come to the person's home unless it is necessary. Keep the room's door shut unless you need to go in or out.      Make sure the person's room has good air flow. You may be able to open the window if the weather allows. An air conditioner can also be turned on to help air move.      Clean items the person uses or touches. Wear disposable gloves to handle the person's laundry. Place the laundry in a plastic bag. Use hot water and soap to wash the person's laundry. Wash eating utensils and other items after the person uses them. Throw your gloves away after you use them.      Clean surfaces often. Use bleach diluted with water or disinfecting wipes. Clean counters, doorknobs, toilet seats, and other surfaces.    Follow up with your doctor as directed: Write down your questions so you remember to ask them during your visits.    For more information:     Centers for Disease Control and Prevention  [address], [place]  Phone: 8-848-7003262  Phone: 7-754-3179505  Web Address: http://www.cdc.gov           © Copyright [employer] 2020       back to top                      © Copyright [employer] 2020           Acute Headache    WHAT YOU NEED TO KNOW:    An acute headache is pain or discomfort that starts suddenly and gets worse quickly. You may have an acute headache only when you feel stress or eat certain foods. Other acute headache pain can happen every day, and sometimes several times a day.     DISCHARGE INSTRUCTIONS:    Return to the emergency department if:     You have severe pain.      You have numbness or weakness on one side of your face or body.      You have a headache that occurs after a blow to the head, a fall, or other trauma.       You have a headache, are forgetful or confused, or have trouble speaking.      You have a headache, stiff neck, and a fever.    Contact your healthcare provider if:     You have a constant headache and are vomiting.      You have a headache each day that does not get better, even after treatment.      You have changes in your headaches, or new symptoms that occur when you have a headache.      You have questions or concerns about your condition or care.    Medicines: You may need any of the following:     Prescription pain medicine may be given. The medicine your healthcare provider recommends will depend on the kind of headaches you have. You will need to take prescription headache medicines as directed to prevent a problem called rebound headache. These headaches happen with regular use of pain relievers for headache disorders.      NSAIDs, such as ibuprofen, help decrease swelling, pain, and fever. This medicine is available with or without a doctor's order. NSAIDs can cause stomach bleeding or kidney problems in certain people. If you take blood thinner medicine, always ask your healthcare provider if NSAIDs are safe for you. Always read the medicine label and follow directions.      Acetaminophen decreases pain and fever. It is available without a doctor's order. Ask how much to take and how often to take it. Follow directions. Read the labels of all other medicines you are using to see if they also contain acetaminophen, or ask your doctor or pharmacist. Acetaminophen can cause liver damage if not taken correctly. Do not use more than 3 grams (3,000 milligrams) total of acetaminophen in one day.       Antidepressants may be given for some kinds of headaches.       Take your medicine as directed. Contact your healthcare provider if you think your medicine is not helping or if you have side effects. Tell him or her if you are allergic to any medicine. Keep a list of the medicines, vitamins, and herbs you take. Include the amounts, and when and why you take them. Bring the list or the pill bottles to follow-up visits. Carry your medicine list with you in case of an emergency.    Manage your symptoms:     Apply heat or ice on the headache area. Use a heat or ice pack. For an ice pack, you can also put crushed ice in a plastic bag. Cover the pack or bag with a towel before you apply it to your skin. Ice and heat both help decrease pain, and heat also helps decrease muscle spasms. Apply heat for 20 to 30 minutes every 2 hours. Apply ice for 15 to 20 minutes every hour. Apply heat or ice for as long and for as many days as directed. You may alternate heat and ice.      Relax your muscles. Lie down in a comfortable position and close your eyes. Relax your muscles slowly. Start at your toes and work your way up your body.      Keep a record of your headaches. Write down when your headaches start and stop. Include your symptoms and what you were doing when the headache began. Record what you ate or drank for 24 hours before the headache started. Describe the pain and where it hurts. Keep track of what you did to treat your headache and if it worked.     Prevent an acute headache:     Avoid anything that triggers an acute headache. Examples include exposure to chemicals, going to high altitude, or not getting enough sleep. Create a regular sleep routine. Go to sleep at the same time and wake up at the same time each day. Do not use electronic devices before bedtime. These may trigger a headache or prevent you from sleeping well.      Do not smoke. Nicotine and other chemicals in cigarettes and cigars can trigger an acute headache or make it worse. Ask your healthcare provider for information if you currently smoke and need help to quit. E-cigarettes or smokeless tobacco still contain nicotine. Talk to your healthcare provider before you use these products.       Limit alcohol as directed. Alcohol can trigger an acute headache or make it worse. If you have cluster headaches, do not drink alcohol during an episode. For other types of headaches, ask your healthcare provider if it is safe for you to drink alcohol. Ask how much is safe for you to drink, and how often.      Exercise as directed. Exercise can reduce tension and help with headache pain. Aim for 30 minutes of physical activity on most days of the week. Your healthcare provider can help you create an exercise plan.      Eat a variety of healthy foods. Healthy foods include fruits, vegetables, low-fat dairy products, lean meats, fish, whole grains, and cooked beans. Your healthcare provider or dietitian can help you create meals plans if you need to avoid foods that trigger headaches.    Follow up with your healthcare provider as directed: Bring your headache record with you when you see your healthcare provider. Write down your questions so you remember to ask them during your visits.       © Copyright [employer] 2020       back to top                      © Copyright [employer] 2020

## 2020-04-13 NOTE — ED PROVIDER NOTE - EYES, MLM
Clear bilaterally, pupils equal, round and reactive to light. Clear bilaterally, pupils equal, round and reactive to light. visual acuity 20/20 right and 20/20 left

## 2020-04-13 NOTE — ED ADULT TRIAGE NOTE - OTHER COMPLAINTS
patient ambulated with steady gait c/o 'nerve pain' x 5 weeks, also endorsing blurry vision, +chills, denies fevers, speaking in full, complete sentences

## 2020-04-13 NOTE — ED PROVIDER NOTE - OBJECTIVE STATEMENT
23 y/o F with PMHx anxiety presents to the ED c/o headache and nerve pain for the past 3 weeks. Says she feels her vision becomes blurry at times, and describes headaches as diffuse pain to the head. States she has nerve pain all over her body. Pt was diagnosed with COVID-19 on 3/19 and states her cough and SOB have since  resolved. Denies chest pain, fever, chills, neck pain, back pain, abdominal pain, nausea, vomiting, or diarrhea. 25 y/o F with PMHx anxiety presents to the ED c/o headache and nerve pain for the past 3 weeks. Says she feels her vision becomes blurry at times, and describes headaches as diffuse pain to the head. States she has nerve pain all over her body. Pt was diagnosed with COVID-19 on 3/19 and states her cough and SOB have since  resolved. Denies chest pain, fever, chills, neck pain, back pain, abdominal pain, nausea, vomiting, or diarrhea. Pt with multiple visits for same and reports saw pmd yesterday.

## 2020-04-13 NOTE — ED ADULT NURSE NOTE - CHPI ED NUR SYMPTOMS NEG
no vomiting/no weakness/no confusion/no loss of consciousness/no change in level of consciousness/no dizziness/no fever/no nausea
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
## 2020-04-13 NOTE — ED PROVIDER NOTE - PATIENT PORTAL LINK FT
You can access the FollowMyHealth Patient Portal offered by [facility] by registering at the following website: [email or web address]. By joining [employer] [facility]’s FollowMyHealth portal, you will also be able to view your health information using other applications (apps) compatible with our system.

## 2020-04-14 ENCOUNTER — APPOINTMENT (OUTPATIENT)
Dept: NEUROLOGY | Facility: CLINIC | Age: 25
End: 2020-04-14
Payer: MEDICAID

## 2020-04-14 ENCOUNTER — EMERGENCY (EMERGENCY)
Facility: HOSPITAL | Age: 25
LOS: 1 days | Discharge: HOME | End: 2020-04-14
Admitting: EMERGENCY MEDICINE
Payer: COMMERCIAL

## 2020-04-14 VITALS
HEIGHT: 64 IN | HEART RATE: 88 BPM | RESPIRATION RATE: 20 BRPM | DIASTOLIC BLOOD PRESSURE: 79 MMHG | SYSTOLIC BLOOD PRESSURE: 123 MMHG | OXYGEN SATURATION: 100 % | TEMPERATURE: 98 F

## 2020-04-14 DIAGNOSIS — U07.1 COVID-19: ICD-10-CM

## 2020-04-14 DIAGNOSIS — Z79.51 LONG TERM (CURRENT) USE OF INHALED STEROIDS: ICD-10-CM

## 2020-04-14 DIAGNOSIS — Z79.899 OTHER LONG TERM (CURRENT) DRUG THERAPY: ICD-10-CM

## 2020-04-14 DIAGNOSIS — R53.1 WEAKNESS: ICD-10-CM

## 2020-04-14 LAB
ALBUMIN SERPL ELPH-MCNC: 5.1 G/DL — HIGH (ref 3.3–5)
ALP SERPL-CCNC: 52 U/L — SIGNIFICANT CHANGE UP (ref 40–120)
ALT FLD-CCNC: 19 U/L — SIGNIFICANT CHANGE UP (ref 10–45)
ANION GAP SERPL CALC-SCNC: 15 MMOL/L — SIGNIFICANT CHANGE UP (ref 5–17)
AST SERPL-CCNC: 26 U/L — SIGNIFICANT CHANGE UP (ref 10–40)
BASOPHILS # BLD AUTO: 0.04 K/UL — SIGNIFICANT CHANGE UP (ref 0–0.2)
BASOPHILS NFR BLD AUTO: 0.8 % — SIGNIFICANT CHANGE UP (ref 0–2)
BILIRUB SERPL-MCNC: 0.5 MG/DL — SIGNIFICANT CHANGE UP (ref 0.2–1.2)
BUN SERPL-MCNC: 7 MG/DL — SIGNIFICANT CHANGE UP (ref 7–23)
CALCIUM SERPL-MCNC: 10.1 MG/DL — SIGNIFICANT CHANGE UP (ref 8.4–10.5)
CHLORIDE SERPL-SCNC: 101 MMOL/L — SIGNIFICANT CHANGE UP (ref 96–108)
CO2 SERPL-SCNC: 24 MMOL/L — SIGNIFICANT CHANGE UP (ref 22–31)
CREAT SERPL-MCNC: 0.72 MG/DL — SIGNIFICANT CHANGE UP (ref 0.5–1.3)
EOSINOPHIL # BLD AUTO: 0.05 K/UL — SIGNIFICANT CHANGE UP (ref 0–0.5)
EOSINOPHIL NFR BLD AUTO: 1 % — SIGNIFICANT CHANGE UP (ref 0–6)
GLUCOSE SERPL-MCNC: 85 MG/DL — SIGNIFICANT CHANGE UP (ref 70–99)
HCG SERPL-ACNC: <0 MIU/ML — SIGNIFICANT CHANGE UP
HCT VFR BLD CALC: 44.8 % — SIGNIFICANT CHANGE UP (ref 34.5–45)
HGB BLD-MCNC: 15.1 G/DL — SIGNIFICANT CHANGE UP (ref 11.5–15.5)
IMM GRANULOCYTES NFR BLD AUTO: 0.4 % — SIGNIFICANT CHANGE UP (ref 0–1.5)
LYMPHOCYTES # BLD AUTO: 1.3 K/UL — SIGNIFICANT CHANGE UP (ref 1–3.3)
LYMPHOCYTES # BLD AUTO: 24.8 % — SIGNIFICANT CHANGE UP (ref 13–44)
MCHC RBC-ENTMCNC: 28.6 PG — SIGNIFICANT CHANGE UP (ref 27–34)
MCHC RBC-ENTMCNC: 33.7 GM/DL — SIGNIFICANT CHANGE UP (ref 32–36)
MCV RBC AUTO: 84.8 FL — SIGNIFICANT CHANGE UP (ref 80–100)
MONOCYTES # BLD AUTO: 0.31 K/UL — SIGNIFICANT CHANGE UP (ref 0–0.9)
MONOCYTES NFR BLD AUTO: 5.9 % — SIGNIFICANT CHANGE UP (ref 2–14)
NEUTROPHILS # BLD AUTO: 3.53 K/UL — SIGNIFICANT CHANGE UP (ref 1.8–7.4)
NEUTROPHILS NFR BLD AUTO: 67.1 % — SIGNIFICANT CHANGE UP (ref 43–77)
NRBC # BLD: 0 /100 WBCS — SIGNIFICANT CHANGE UP (ref 0–0)
PLATELET # BLD AUTO: 269 K/UL — SIGNIFICANT CHANGE UP (ref 150–400)
POTASSIUM SERPL-MCNC: 4.1 MMOL/L — SIGNIFICANT CHANGE UP (ref 3.5–5.3)
POTASSIUM SERPL-SCNC: 4.1 MMOL/L — SIGNIFICANT CHANGE UP (ref 3.5–5.3)
PROT SERPL-MCNC: 8.2 G/DL — SIGNIFICANT CHANGE UP (ref 6–8.3)
RBC # BLD: 5.28 M/UL — HIGH (ref 3.8–5.2)
RBC # FLD: 12.8 % — SIGNIFICANT CHANGE UP (ref 10.3–14.5)
SODIUM SERPL-SCNC: 140 MMOL/L — SIGNIFICANT CHANGE UP (ref 135–145)
WBC # BLD: 5.25 K/UL — SIGNIFICANT CHANGE UP (ref 3.8–10.5)
WBC # FLD AUTO: 5.25 K/UL — SIGNIFICANT CHANGE UP (ref 3.8–10.5)

## 2020-04-14 PROCEDURE — 99284 EMERGENCY DEPT VISIT MOD MDM: CPT

## 2020-04-14 PROCEDURE — 87040 BLOOD CULTURE FOR BACTERIA: CPT

## 2020-04-14 PROCEDURE — 70553 MRI BRAIN STEM W/O & W/DYE: CPT

## 2020-04-14 PROCEDURE — 70553 MRI BRAIN STEM W/O & W/DYE: CPT | Mod: 26

## 2020-04-14 PROCEDURE — 36415 COLL VENOUS BLD VENIPUNCTURE: CPT

## 2020-04-14 PROCEDURE — 85025 COMPLETE CBC W/AUTO DIFF WBC: CPT

## 2020-04-14 PROCEDURE — 84702 CHORIONIC GONADOTROPIN TEST: CPT

## 2020-04-14 PROCEDURE — 99285 EMERGENCY DEPT VISIT HI MDM: CPT

## 2020-04-14 PROCEDURE — 99203 OFFICE O/P NEW LOW 30 MIN: CPT | Mod: 95

## 2020-04-14 PROCEDURE — A9585: CPT

## 2020-04-14 PROCEDURE — 80053 COMPREHEN METABOLIC PANEL: CPT

## 2020-04-14 NOTE — CONSULT NOTE ADULT - SUBJECTIVE AND OBJECTIVE BOX
Neurology Consult Note    HPI:  23yo F pmh anxiety, covid + from 3/29 w/ multiple [facility] ED visits w/ neg labs/trop/ddimer/rvp/cx and outside ED visits since returns c/o worsening nerve pain all over her body x 1 week. Reports sharp pains. Pt states blurry vision, slurred speech, difficult concentrating, HA and nausea. Pt had a telemedicine visit with Dr. Zepeda who referred her to the ED for an MRI.  Pt had a negative CT at her last visit. Reports cough is mild and improved. Reports generalized weakness and difficulty walking. Denies fever, chills, vomiting, dizziness, cp, focal weak    MEDICAL & SURGICAL HISTORY:  Anxiety  Anemia  Gallstones  No significant past surgical history      FAMILY HISTORY:    MEDICATIONS  (STANDING):    MEDICATIONS  (PRN):      Allergies    No Known Allergies    Intolerances        Vital Signs Last 24 Hrs  T(C): 36.8 (14 Apr 2020 14:39), Max: 36.8 (14 Apr 2020 14:39)  T(F): 98.2 (14 Apr 2020 14:39), Max: 98.2 (14 Apr 2020 14:39)  HR: 88 (14 Apr 2020 14:39) (88 - 88)  BP: 123/79 (14 Apr 2020 14:39) (123/79 - 123/79)  BP(mean): --  RR: 20 (14 Apr 2020 14:39) (20 - 20)  SpO2: 100% (14 Apr 2020 14:39) (100% - 100%)    Physical exam:  Neurologic:  -Mental status: Awake, alert, oriented to person, place, and time. Speech is fluent with intact naming, repetition, and comprehension, no dysarthria. Recent and remote memory intact. Follows commands. Attention/concentration intact. Fund of knowledge appropriate.  -Cranial nerves:   II: Visual fields are full to confrontation.  III, IV, VI: Extraocular movements are intact without nystagmus. Pupils equally round and reactive to light  V:  Facial sensation V1-V3 equal and intact   VII: Face is symmetric with normal eye closure and smile  VIII: Hearing is bilaterally intact to finger rub  IX, X: Uvula is midline and soft palate rises symmetrically  Motor: Normal bulk and tone. No pronator drift. Strength bilateral deltoid, bicep, tricep, wrist flexion/extension, finger flexion/extension 5/5, bilateral lower extremities 5/5.  Sensation: Intact to light touch bilaterally. No neglect or extinction on double simultaneous testing.  Coordination: No dysmetria on finger-to-nose   Gait: Narrow gait and steady    LABS:                        15.1   5.25  )-----------( 269      ( 14 Apr 2020 15:39 )             44.8     04-14    140  |  101  |  7   ----------------------------<  85  4.1   |  24  |  0.72    Ca    10.1      14 Apr 2020 15:39    TPro  8.2  /  Alb  5.1<H>  /  TBili  0.5  /  DBili  x   /  AST  26  /  ALT  19  /  AlkPhos  52  04-14      Urinalysis Basic - ( 13 Apr 2020 15:42 )    Color: Straw / Appearance: Clear / SG: <=1.005 / pH: x  Gluc: x / Ketone: NEGATIVE  / Bili: Negative / Urobili: 0.2 E.U./dL   Blood: x / Protein: NEGATIVE mg/dL / Nitrite: NEGATIVE   Leuk Esterase: NEGATIVE / RBC: x / WBC x   Sq Epi: x / Non Sq Epi: x / Bacteria: x        RADIOLOGY & ADDITIONAL TESTS:  < from: MR Head w/wo IV Cont (04.14.20 @ 17:32) >    FINDINGS:     The ventricles, cisternal spaces, and cortical sulci are normal in size and configuration.     There is no mass, mass effect, midline shift or extra-axial collection.     No focal abnormal signal is identified within the brain parenchyma. The diffusion-weighted images demonstrate no recent infarction no evidence of encephalitis. Consider CSF sampling for more comprehensive workup of suspected infectious versus inflammatory process.    The gradient echo images show no parenchymal blood product deposition. There are preserved large vascular flow-voids.    The postcontrast images demonstrate no abnormal intracranial enhancement.     The visualized paranasal sinuses show polypoid changes at the maxillary alveolar recesses. No fluid level. The mastoid air cells are well-aerated.    IMPRESSION:    Unremarkable contrast-enhanced MRI of the brain.        Assessment and Plan  23yo F pmh anxiety, covid + from 3/29 w/ multiple [facility] ED visits w/ neg labs/trop/ddimer/rvp/cx and outside ED visits since returns c/o worsening nerve pain all over her body x 1 week. Pt had a telemedicine visit with Dr. Zepeda who referred her to the ED for an MRI. MRI brain with and without contrast unremarkable, patient's physical exam unremarkable with no neuro deficits. Symptoms likely secondary to anxiety not COVID related encephalopathy.     - no neuro for further neurologic workup  - dispo per ED    Caryl Jackman  Neurology Stroke NP  [phone]

## 2020-04-14 NOTE — ED ADULT TRIAGE NOTE - CHIEF COMPLAINT QUOTE
"I was told to ask Dr. Bellamy for an emergency MRI of my brain, I was here yesterday something is happening to my brain"

## 2020-04-14 NOTE — ED PROVIDER NOTE - NSFOLLOWUPINSTRUCTIONS_ED_ALL_ED_FT
Paresthesia    WHAT YOU NEED TO KNOW:    Paresthesia is numbness, tingling, or burning. It can happen in any part of your body, but usually occurs in your legs, feet, arms, or hands.    DISCHARGE INSTRUCTIONS:    Return to the emergency department if:     You have severe pain along with numbness and tingling.      Your legs suddenly become cold. You have trouble moving your legs, and they ache.      You have increased weakness in a part of your body.      You have uncontrolled movements.    Contact your healthcare provider or neurologist if:     Your symptoms do not improve.      You have symptoms in more than one part of your body.      You have questions or concerns about your condition or care.    Manage paresthesia:     Protect the area from injury. You may injure or burn yourself if you lose feeling in the area. Be careful when you touch anything that could be hot. Wear sturdy shoes to protect your feet. Ask about other ways to protect yourself.       Go to physical or occupational therapy if directed. Your provider may recommend therapy if you have a condition such as carpal tunnel syndrome. A physical therapist can teach you exercises to help strengthen the area or increase your ability to move it. An occupational therapist can help you find new ways to do your daily activities.      Manage health conditions that can cause paresthesia. Work with your diabetes specialist if you have uncontrolled diabetes. A dietitian or your healthcare provider can help you create a meal plan if you have low vitamin B levels. Your provider can help you manage your health if you have multiple sclerosis or you had a stroke. It is important to manage health conditions to stop paresthesia or prevent it from getting worse.    Follow up with your healthcare provider or neurologist as directed: Your healthcare provider may refer you to a specialist. Write down your questions so you remember to ask them during your visits.    You have been diagnosed with COVID19 infection    RETURN TO THE EMERGENCY DEPARTMENT FOR DIFFICULTY SPEAKING IN FULL SENTENCES, FEELING SHORT OF BREATH WALKING ROOM TO ROOM AT HOME OR UPSTAIRS, OR OTHER CONCERNING SYMPTOMS.     PLEASE CONTINUE TO ISOLATE YOURSELF AT HOME FOR 14 DAYS, OR LONGER IF YOU CONTINUE TO HAVE SYMPTOMS.     YOU MAY DISCONTINUE ISOLATION WHEN:  1. AT LEAST 3 DAYS (72 HOURS) HAVE PASSED SINCE RECOVERY, WHICH IS DEFINED AS HAVING NO FEVER WITHOUT THE USE OF FEVER-REDUCING MEDICATIONS (SUCH AS TYLENOL OR MOTRIN) AND RESPIRATORY SYMPTOMS (COUGH, SHORTNESS OF BREATH), AND  2. AT LEAST 7 DAYS HAVE PASSED SINCE THE SYMPTOMS FIRST BEGAN  BOTH CONDITIONS MUST BE MET TO DISCONTINUE ISOLATION    DO NOT LEAVE HOME. DO NOT TAKE PUBLIC TRANSPORTATION. IF YOU HAVE OTHERS IN YOUR HOME REMAIN 6-10 FEET FROM THEM AND USE THE MASK AT HOME. IF YOU MUST LEAVE THE HOUSE, USE THE MASK.     Check the CDC website (cdc.gov) for updates.    General information for spread of infection:     Avoid close contact with people who are sick.  Avoid touching your eyes, nose, and mouth.  Stay home when you are sick.  Cover your cough or sneeze with a tissue, then throw the tissue in the trash.  Clean and disinfect frequently touched objects and surfaces using a regular household cleaning spray or wipe.  Follow CDC’s recommendations for using a facemask.  CDC does not recommend that people who are well wear a facemask to protect themselves from respiratory diseases, including COVID-19.  Facemasks should be used by people who show symptoms of COVID-19 to help prevent the spread of the disease to  others. The use of facemasks is also crucial for health workers and people who are taking care of someone in close settings (at home or in a health care facility).  Wash your hands often with soap and water for at least 20 seconds, especially after going to the bathroom; before eating; and after blowing your nose, coughing, or sneezing.  If soap and water are not readily available, use an alcohol-based hand  with at least 60% alcohol. Always wash hands with soap and water if hands are visibly dirty.

## 2020-04-14 NOTE — ED PROVIDER NOTE - PHYSICAL EXAMINATION
CONSTITUTIONAL: Well-appearing; well-nourished; in no apparent distress.   HEAD: Normocephalic; atraumatic.   EYES: PERRL; EOM intact; conjunctiva and sclera clear  ENT: normal nose; no rhinorrhea; normal pharynx with no erythema or lesions.   NECK: Supple; non-tender; no LAD  CARDIOVASCULAR: Normal S1, S2; no murmurs, rubs, or gallops. Regular rate and rhythm.   RESPIRATORY: Breathing easily; breath sounds clear and equal bilaterally; no wheezes, rhonchi, or rales.  GI: Soft; non-distended; non-tender; no palpable organomegaly.   MSK: FROM at all extremities, normal tone   EXT: No cyanosis or edema; N/V intact  SKIN: Normal for age and race; warm; dry; good turgor; no apparent lesions or rash.   NEURO: AAO x 3, CN II-XII intact, normal speech, strength 5/5 bilateral upper and lower extremities, sensation intact, cerebellum intact, no ataxia, follows commands appropriately  PSYCHOLOGICAL: The patient’s mood and manner are appropriate. CONSTITUTIONAL: Well-appearing; well-nourished; in no apparent distress.   HEAD: Normocephalic; atraumatic.   EYES: PERRL; EOM intact; conjunctiva and sclera clear  ENT: normal nose; no rhinorrhea; normal pharynx with no erythema or lesions.   NECK: Supple; non-tender; no LAD  CARDIOVASCULAR: Normal S1, S2; no murmurs, rubs, or gallops. Regular rate and rhythm.   RESPIRATORY: Breathing easily; breath sounds clear and equal bilaterally; no wheezes, rhonchi, or rales.  GI: Soft; non-distended; non-tender; no palpable organomegaly.   MSK: FROM at all extremities, normal tone   EXT: No cyanosis or edema; N/V intact  SKIN: Normal for age and race; warm; dry; good turgor; no apparent lesions or rash.   NEURO: AAO x 3, CN II-XII intact, normal speech, strength 5/5 bilateral upper and lower extremities, sensation intact, cerebellum intact, no ataxia, follows commands appropriately; DTRs 2+  PSYCHOLOGICAL: The patient’s mood and manner are appropriate.

## 2020-04-14 NOTE — ED PROVIDER NOTE - PATIENT PORTAL LINK FT
You can access the FollowMyHealth Patient Portal offered by [facility] by registering at the following website: [email or web address]. By joining [employer]’s FollowMyHealth portal, you will also be able to view your health information using other applications (apps) compatible with our system.

## 2020-04-14 NOTE — ED PROVIDER NOTE - CLINICAL SUMMARY MEDICAL DECISION MAKING FREE TEXT BOX
25 yo F with anxiety, covid + from 3/29 w/ multiple [facility] ED visits w/ neg labs/trop/ddimer/rvp/cx and outside ED visits since returns c/o worsening nerve pain all over her body x 1 week. Reports sharp pains. Pt states blurry vision, slurred speech, difficult concentrating, HA and nausea. Pt had a telemedicine visit with Dr. Zepeda who referred her to the ED for an MRI.  Pt had a negative CT at her last visit. Reports cough is mild and improved. Reports generalized weakness and difficulty walking. Denies fever, chills, vomiting, dizziness, cp, focal weakness. 23 yo F with anxiety, covid + from 3/29 w/ multiple [facility] ED visits w/ neg labs/trop/ddimer/rvp/cx and outside ED visits since returns c/o worsening nerve pain all over her body x 1 week. Reports sharp pains. Pt states blurry vision, slurred speech, difficult concentrating, HA and nausea. Pt had a telemedicine visit with Dr. Zepeda who referred her to the ED for an MRI.  Pt had a negative CT at her last visit. Reports cough is mild and improved. Reports generalized weakness and difficulty walking. Denies fever, chills, vomiting, dizziness, cp, focal weakness. Seen via telemedicine by Dr. Zepeda and referred for MRI

## 2020-04-14 NOTE — ED PROVIDER NOTE - OBJECTIVE STATEMENT
25 yo F with anxiety, covid + from 3/29 w/ multiple [facility] ED visits w/ neg labs/trop/ddimer/rvp/cx and outside ED visits since returns c/o worsening nerve pain all over her body x 1 week. Reports sharp pains. Pt states blurry vision, slurred speech, difficult concentrating, HA and nausea. Pt had a telemedicine visit with Dr. Zepeda who referred her to the ED for an MRI.  Pt had a negative CT at her last visit. Reports cough is mild and improved. Reports generalized weakness and difficulty walking. Denies fever, chills, vomiting, dizziness, cp, focal weakness.

## 2020-04-14 NOTE — HISTORY OF PRESENT ILLNESS
[Home] : at home, [unfilled] , at the time of the visit. [Other Location: e.g. Home (Enter Location, City,State)___] : at [unfilled] [Friend] : friend [Patient] : the patient [FreeTextEntry1] : 24 year old COVID positive -\par reports headaches fatigue hallucinations - weakness in her arms and legs .\par She was seen on multiple visits at [facility] ER \par no bowel or bladder symptoms \par  No dysphagia - no SOB\par \par Wilver her boy violeta was on the TELE with HIPPA and consent\par \par Awake - no aphasia\par  Full EOM\par  No diplopia,'\par Positive Romberg \par  No tremors - no dysmetria\par  Full ROM on neck \par Answers all questions - without dysarthria \par \par r/o encephalitis \par  r/o pneumonia \par \par MRI head with contrast\par  chest x-ray

## 2020-04-14 NOTE — CONSULT NOTE ADULT - ATTENDING COMMENTS
The    case    was    discussed    with   ER  attending.   No  focal    neurological    findings.  Labs     abd    radiological    findings   were   reviewed.   The    patient     will    be     discharged    with    recommendation    to    follow up  as   an OP.

## 2020-04-17 DIAGNOSIS — R51 HEADACHE: ICD-10-CM

## 2020-04-17 DIAGNOSIS — H53.8 OTHER VISUAL DISTURBANCES: ICD-10-CM

## 2020-04-18 DIAGNOSIS — M79.2 NEURALGIA AND NEURITIS, UNSPECIFIED: ICD-10-CM

## 2020-04-18 DIAGNOSIS — R20.2 PARESTHESIA OF SKIN: ICD-10-CM

## 2020-04-18 DIAGNOSIS — Z79.899 OTHER LONG TERM (CURRENT) DRUG THERAPY: ICD-10-CM

## 2020-04-18 LAB
CULTURE RESULTS: NO GROWTH — SIGNIFICANT CHANGE UP
CULTURE RESULTS: NO GROWTH — SIGNIFICANT CHANGE UP
SPECIMEN SOURCE: SIGNIFICANT CHANGE UP
SPECIMEN SOURCE: SIGNIFICANT CHANGE UP

## 2020-04-29 ENCOUNTER — EMERGENCY (EMERGENCY)
Facility: HOSPITAL | Age: 25
LOS: 1 days | Discharge: HOME | End: 2020-04-29
Attending: EMERGENCY MEDICINE | Admitting: EMERGENCY MEDICINE
Payer: COMMERCIAL

## 2020-04-29 VITALS
WEIGHT: 130.07 LBS | TEMPERATURE: 99 F | SYSTOLIC BLOOD PRESSURE: 122 MMHG | HEART RATE: 103 BPM | DIASTOLIC BLOOD PRESSURE: 85 MMHG | OXYGEN SATURATION: 100 % | RESPIRATION RATE: 18 BRPM

## 2020-04-29 LAB
ALBUMIN SERPL ELPH-MCNC: 4.6 G/DL — SIGNIFICANT CHANGE UP (ref 3.3–5)
ALP SERPL-CCNC: 49 U/L — SIGNIFICANT CHANGE UP (ref 40–120)
ALT FLD-CCNC: 19 U/L — SIGNIFICANT CHANGE UP (ref 10–45)
ANION GAP SERPL CALC-SCNC: 11 MMOL/L — SIGNIFICANT CHANGE UP (ref 5–17)
APPEARANCE UR: CLEAR — SIGNIFICANT CHANGE UP
APTT BLD: 30.3 SEC — SIGNIFICANT CHANGE UP (ref 27.5–36.3)
AST SERPL-CCNC: 20 U/L — SIGNIFICANT CHANGE UP (ref 10–40)
BASOPHILS # BLD AUTO: 0.03 K/UL — SIGNIFICANT CHANGE UP (ref 0–0.2)
BASOPHILS NFR BLD AUTO: 0.5 % — SIGNIFICANT CHANGE UP (ref 0–2)
BILIRUB SERPL-MCNC: 0.2 MG/DL — SIGNIFICANT CHANGE UP (ref 0.2–1.2)
BILIRUB UR-MCNC: NEGATIVE — SIGNIFICANT CHANGE UP
BUN SERPL-MCNC: 6 MG/DL — LOW (ref 7–23)
CALCIUM SERPL-MCNC: 9.5 MG/DL — SIGNIFICANT CHANGE UP (ref 8.4–10.5)
CHLORIDE SERPL-SCNC: 105 MMOL/L — SIGNIFICANT CHANGE UP (ref 96–108)
CO2 SERPL-SCNC: 25 MMOL/L — SIGNIFICANT CHANGE UP (ref 22–31)
COLOR SPEC: YELLOW — SIGNIFICANT CHANGE UP
CREAT SERPL-MCNC: 0.67 MG/DL — SIGNIFICANT CHANGE UP (ref 0.5–1.3)
DIFF PNL FLD: NEGATIVE — SIGNIFICANT CHANGE UP
EOSINOPHIL # BLD AUTO: 0.16 K/UL — SIGNIFICANT CHANGE UP (ref 0–0.5)
EOSINOPHIL NFR BLD AUTO: 2.5 % — SIGNIFICANT CHANGE UP (ref 0–6)
GLUCOSE SERPL-MCNC: 73 MG/DL — SIGNIFICANT CHANGE UP (ref 70–99)
GLUCOSE UR QL: NEGATIVE — SIGNIFICANT CHANGE UP
HCT VFR BLD CALC: 44.3 % — SIGNIFICANT CHANGE UP (ref 34.5–45)
HGB BLD-MCNC: 14.6 G/DL — SIGNIFICANT CHANGE UP (ref 11.5–15.5)
IMM GRANULOCYTES NFR BLD AUTO: 0.2 % — SIGNIFICANT CHANGE UP (ref 0–1.5)
KETONES UR-MCNC: NEGATIVE — SIGNIFICANT CHANGE UP
LEUKOCYTE ESTERASE UR-ACNC: ABNORMAL
LYMPHOCYTES # BLD AUTO: 1.85 K/UL — SIGNIFICANT CHANGE UP (ref 1–3.3)
LYMPHOCYTES # BLD AUTO: 28.8 % — SIGNIFICANT CHANGE UP (ref 13–44)
MCHC RBC-ENTMCNC: 28.3 PG — SIGNIFICANT CHANGE UP (ref 27–34)
MCHC RBC-ENTMCNC: 33 GM/DL — SIGNIFICANT CHANGE UP (ref 32–36)
MCV RBC AUTO: 85.9 FL — SIGNIFICANT CHANGE UP (ref 80–100)
MONOCYTES # BLD AUTO: 0.55 K/UL — SIGNIFICANT CHANGE UP (ref 0–0.9)
MONOCYTES NFR BLD AUTO: 8.6 % — SIGNIFICANT CHANGE UP (ref 2–14)
NEUTROPHILS # BLD AUTO: 3.83 K/UL — SIGNIFICANT CHANGE UP (ref 1.8–7.4)
NEUTROPHILS NFR BLD AUTO: 59.4 % — SIGNIFICANT CHANGE UP (ref 43–77)
NITRITE UR-MCNC: NEGATIVE — SIGNIFICANT CHANGE UP
NRBC # BLD: 0 /100 WBCS — SIGNIFICANT CHANGE UP (ref 0–0)
PH UR: 7 — SIGNIFICANT CHANGE UP (ref 5–8)
PLATELET # BLD AUTO: 238 K/UL — SIGNIFICANT CHANGE UP (ref 150–400)
POTASSIUM SERPL-MCNC: 4.2 MMOL/L — SIGNIFICANT CHANGE UP (ref 3.5–5.3)
POTASSIUM SERPL-SCNC: 4.2 MMOL/L — SIGNIFICANT CHANGE UP (ref 3.5–5.3)
PROT SERPL-MCNC: 7.3 G/DL — SIGNIFICANT CHANGE UP (ref 6–8.3)
PROT UR-MCNC: NEGATIVE MG/DL — SIGNIFICANT CHANGE UP
RBC # BLD: 5.16 M/UL — SIGNIFICANT CHANGE UP (ref 3.8–5.2)
RBC # FLD: 12.7 % — SIGNIFICANT CHANGE UP (ref 10.3–14.5)
SODIUM SERPL-SCNC: 141 MMOL/L — SIGNIFICANT CHANGE UP (ref 135–145)
SP GR SPEC: 1.01 — SIGNIFICANT CHANGE UP (ref 1–1.03)
TROPONIN T SERPL-MCNC: <0.01 NG/ML — SIGNIFICANT CHANGE UP (ref 0–0.01)
UROBILINOGEN FLD QL: 0.2 E.U./DL — SIGNIFICANT CHANGE UP
WBC # BLD: 6.43 K/UL — SIGNIFICANT CHANGE UP (ref 3.8–10.5)
WBC # FLD AUTO: 6.43 K/UL — SIGNIFICANT CHANGE UP (ref 3.8–10.5)

## 2020-04-29 PROCEDURE — 85025 COMPLETE CBC W/AUTO DIFF WBC: CPT

## 2020-04-29 PROCEDURE — 80053 COMPREHEN METABOLIC PANEL: CPT

## 2020-04-29 PROCEDURE — 93010 ELECTROCARDIOGRAM REPORT: CPT

## 2020-04-29 PROCEDURE — 81001 URINALYSIS AUTO W/SCOPE: CPT

## 2020-04-29 PROCEDURE — 36415 COLL VENOUS BLD VENIPUNCTURE: CPT

## 2020-04-29 PROCEDURE — 96375 TX/PRO/DX INJ NEW DRUG ADDON: CPT

## 2020-04-29 PROCEDURE — 99284 EMERGENCY DEPT VISIT MOD MDM: CPT | Mod: 25

## 2020-04-29 PROCEDURE — 99285 EMERGENCY DEPT VISIT HI MDM: CPT

## 2020-04-29 PROCEDURE — 93005 ELECTROCARDIOGRAM TRACING: CPT

## 2020-04-29 PROCEDURE — 96374 THER/PROPH/DIAG INJ IV PUSH: CPT

## 2020-04-29 PROCEDURE — 84484 ASSAY OF TROPONIN QUANT: CPT

## 2020-04-29 PROCEDURE — 85730 THROMBOPLASTIN TIME PARTIAL: CPT

## 2020-04-29 RX ORDER — DIPHENHYDRAMINE HCL 50 MG
25 CAPSULE ORAL ONCE
Refills: 0 | Status: COMPLETED | OUTPATIENT
Start: 2020-04-29 | End: 2020-04-29

## 2020-04-29 RX ORDER — SODIUM CHLORIDE 9 MG/ML
1000 INJECTION INTRAMUSCULAR; INTRAVENOUS; SUBCUTANEOUS ONCE
Refills: 0 | Status: COMPLETED | OUTPATIENT
Start: 2020-04-29 | End: 2020-04-29

## 2020-04-29 RX ORDER — ONDANSETRON 8 MG/1
4 TABLET, FILM COATED ORAL ONCE
Refills: 0 | Status: COMPLETED | OUTPATIENT
Start: 2020-04-29 | End: 2020-04-29

## 2020-04-29 RX ORDER — ACETAMINOPHEN 500 MG
650 TABLET ORAL ONCE
Refills: 0 | Status: COMPLETED | OUTPATIENT
Start: 2020-04-29 | End: 2020-04-29

## 2020-04-29 RX ADMIN — SODIUM CHLORIDE 1000 MILLILITER(S): 9 INJECTION INTRAMUSCULAR; INTRAVENOUS; SUBCUTANEOUS at 18:30

## 2020-04-29 RX ADMIN — Medication 650 MILLIGRAM(S): at 18:30

## 2020-04-29 RX ADMIN — Medication 25 MILLIGRAM(S): at 18:30

## 2020-04-29 RX ADMIN — ONDANSETRON 4 MILLIGRAM(S): 8 TABLET, FILM COATED ORAL at 18:30

## 2020-04-29 NOTE — ED PROVIDER NOTE - OBJECTIVE STATEMENT
24 year old female with history of recently diagnosed COVID-19 infection at end of March presents to ED with concern for feeling generally weak, headaches, and not fully back to baseline following diagnosis.  Patient states she has not yet regained her sense of smell or taste.  She had COVID testing repeated recently by her PCP, however does not yet have the result.  She denies associated fever, chills, chest pain, shortness of breath, visual changes, abdominal pain, nausea, emesis, changes to bowel movements, peripheral edema, rashes or any additional acute complaints or concerns at this time. 24 year old female with history of anxiety, recently diagnosed COVID-19 infection at end of March presents to ED with concern for feeling generally weak, headaches, and not fully back to baseline following diagnosis.  Patient states she has not yet regained her sense of smell or taste.  Of note, patient with multiple visits to ED following initial COVID diagnosis and has had multiple imaging studies completed including CT head and MR brain - both unremarkable in mid April.  She had COVID testing repeated recently by her PCP, however does not yet have the result.  She denies associated fever, chills, chest pain, shortness of breath, visual changes, abdominal pain, nausea, emesis, changes to bowel movements, peripheral edema, rashes or any additional acute complaints or concerns at this time.

## 2020-04-29 NOTE — ED PROVIDER NOTE - PATIENT PORTAL LINK FT
You can access the FollowMyHealth Patient Portal offered by [facility] by registering at the following website: [email or web address]. By joining [employer]’s FollowMyHealth portal, you will also be able to view your health information using other applications (apps) compatible with our system.

## 2020-04-29 NOTE — ED PROVIDER NOTE - CLINICAL SUMMARY MEDICAL DECISION MAKING FREE TEXT BOX
Patient in ED w concern for ongoing symptoms of headache, generalized weakness following COVID-19 infection.  Patient with extensive work up in the past several weeks including multiple ED visits and subsequent CT head, MR brain all unremarkable.  Patient is well appearing with benign physical exam free of focal neuro deficits.  Patient is aware of all labs results and is offered CXR and repeat  CT head - however declines any further "radiation" at this time.  She is aware of recommendation for prompt PCP Follow up for re evaluation to ensure her symptoms are improving.  We did discuss possibility of residual symptoms following COVID, etc.  Patient is advised to follow up with their PCP in 1-2 days without fail via phone call to discuss symptoms, etc and ensure improvement.  Patient instructed to return to ED immediately should their symptoms worsen or if there is any concern prior to the recommended PCP follow up.  Patient is aware of plan and verbalizes their understanding.  Will discharge home at this time.

## 2020-04-29 NOTE — ED ADULT TRIAGE NOTE - CHIEF COMPLAINT QUOTE
pt complaining of head ache blurry vision nausea no vomiting with generalized weakness x 2 weeks states she was COVID+ 2 weeks ago and was retested at her PCP but does not have the result. Denies SOB

## 2020-04-29 NOTE — ED PROVIDER NOTE - NSFOLLOWUPINSTRUCTIONS_ED_ALL_ED_FT
Please follow up with your primary physician in 1-2 days for re evaluation.  Please return to ER immediately should your symptoms worsen or if you have any concern prior to this recommended follow up.    General Headache    WHAT YOU NEED TO KNOW:    Headache pain may be mild or severe. Common causes include stress, medicines, and head injuries. Sleep problems, allergies, and hormone changes can also cause a headache. You may have frequent headaches that have no clear cause. Pain may start in another part of your body and move to your head. Headache pain can also move to other parts of your body. A headache can cause other symptoms, such as nausea and vomiting. A severe headache may be a sign of a stroke or other serious problem that needs immediate treatment.    DISCHARGE INSTRUCTIONS:    Call 911 for any of the following:     You have any of the following signs of a stroke:   Numbness or drooping on one side of your face       Weakness in an arm or leg      Confusion or difficulty speaking      Dizziness, a severe headache, or vision loss        Return to the emergency department if:     You have a headache with neck stiffness and a fever.      You have a constant headache and are vomiting.      You have severe pain that does not get better after you take pain medicine.      You have a headache and the pain worsens when you look into light.      You have a headache and vision changes, such as blurred vision.      You have a headache and are forgetful or confused.    Contact your healthcare provider if:     You have a headache each day that does not get better, even after treatment.      You have changes in your headaches, or new symptoms that occur when you have a headache.      Others you live or work with also have headaches.      You have questions or concerns about your condition or care.    Medicines: You may need any of the following:     Medicines may be given to prevent or treat headache pain. Do not wait until the pain is severe to take your medicine. Ask your healthcare provider how to take the medicine safely.       NSAIDs, such as ibuprofen, help decrease swelling, pain, and fever. This medicine is available with or without a doctor's order. NSAIDs can cause stomach bleeding or kidney problems in certain people. If you take blood thinner medicine, always ask if NSAIDs are safe for you. Always read the medicine label and follow directions. Do not give these medicines to children under 6 months of age without direction from your child's healthcare provider.      Acetaminophen decreases pain and fever. It is available without a doctor's order. Ask how much to take and how often to take it. Follow directions. Read the labels of all other medicines you are using to see if they also contain acetaminophen, or ask your doctor or pharmacist. Acetaminophen can cause liver damage if not taken correctly. Do not use more than 4 grams (4,000 milligrams) total of acetaminophen in one day.       Antinausea medicine may be given to calm your stomach and help prevent vomiting.      Take your medicine as directed. Contact your healthcare provider if you think your medicine is not helping or if you have side effects. Tell him of her if you are allergic to any medicine. Keep a list of the medicines, vitamins, and herbs you take. Include the amounts, and when and why you take them. Bring the list or the pill bottles to follow-up visits. Carry your medicine list with you in case of an emergency.    Manage your symptoms:     Rest in a dark and quiet room. This may help decrease your pain.      Apply heat or ice as directed. Heat or ice may help decrease pain or muscle spasms. Apply heat or ice on the area for 20 minutes every 2 hours for as many days as directed. Your healthcare provider may recommend that you alternate heat and ice.      Relax your muscles to help relieve a headache. Lie down in a comfortable position and close your eyes. Relax your muscles slowly. Start at your toes and work your way up your body. A massage or warm bath may also help relax your muscles.    Keep a headache record: Record the dates and times that you get headaches, and what you were doing before the headache started. Also record what you ate and drank in the 24 hours before the headache started. This might help your healthcare provider find the cause of your headaches and make a treatment plan. The record can also help you avoid headache triggers or manage your symptoms.    Get enough sleep: You should get 8 to 10 hours of sleep each night. Create a sleep schedule. Go to bed and wake up at the same times each day. It may be helpful to do something relaxing before bed. Do not watch television right before bed.    Do not smoke: Nicotine and other chemicals in cigarettes and cigars can trigger a headache or make it worse. Ask your healthcare provider for information if you currently smoke and need help to quit. E-cigarettes or smokeless tobacco still contain nicotine. Talk to your healthcare provider before you use these products.     Drink liquids as directed: You may need to drink more liquid to prevent dehydration. Dehydration can cause a headache. Ask your healthcare provider how much liquid to drink each day and which liquids are best for you.     Limit caffeine and alcohol as directed: Your headaches may be triggered by caffeine or alcohol. You may also develop a headache if you drink caffeine regularly and suddenly stop.    Eat a variety of healthy foods: Do not skip meals. Too little food can trigger a headache. Include fruits, vegetables, whole-grain breads, low-fat dairy products, beans, lean meat, and fish. Do not have trigger foods, such as chocolate and red wine. Foods that contain gluten, nitrates, MSG, or artificial sweeteners may also trigger a headache.    Follow up with your healthcare provider as directed: Write down your questions so you remember to ask them during your visits.        © Copyright [employer] 2020       back to top                      © Copyright [employer] 2020

## 2020-04-29 NOTE — ED PROVIDER NOTE - NS ED ROS FT
Constitutional: No fever or chills.   Eyes: No pain, blurry vision, or discharge.  ENMT: No hearing changes, pain, discharge or infections. No neck pain or stiffness.  Cardiac: No chest pain, SOB or edema. No chest pain with exertion.  Respiratory: No cough or respiratory distress. No hemoptysis. No history of asthma or RAD.  GI: No nausea, vomiting, diarrhea or abdominal pain.  : No dysuria, frequency or burning.  MS: No myalgia, muscle weakness, joint pain or back pain.  Neuro: + Headache, + generalized weakness. No LOC.  Skin: No skin rash.   Endocrine: No history of thyroid disease or diabetes.  Except as documented in the HPI, all other systems are negative.

## 2020-04-30 ENCOUNTER — EMERGENCY (EMERGENCY)
Facility: HOSPITAL | Age: 25
LOS: 1 days | Discharge: HOME | End: 2020-04-30
Attending: EMERGENCY MEDICINE | Admitting: EMERGENCY MEDICINE
Payer: COMMERCIAL

## 2020-04-30 VITALS
DIASTOLIC BLOOD PRESSURE: 72 MMHG | RESPIRATION RATE: 18 BRPM | SYSTOLIC BLOOD PRESSURE: 123 MMHG | TEMPERATURE: 98 F | OXYGEN SATURATION: 100 % | HEART RATE: 84 BPM

## 2020-04-30 VITALS
OXYGEN SATURATION: 98 % | SYSTOLIC BLOOD PRESSURE: 124 MMHG | DIASTOLIC BLOOD PRESSURE: 67 MMHG | TEMPERATURE: 98 F | HEART RATE: 81 BPM | HEIGHT: 63 IN | WEIGHT: 136.03 LBS | RESPIRATION RATE: 18 BRPM

## 2020-04-30 PROCEDURE — 99284 EMERGENCY DEPT VISIT MOD MDM: CPT

## 2020-04-30 PROCEDURE — 99283 EMERGENCY DEPT VISIT LOW MDM: CPT

## 2020-04-30 RX ORDER — IBUPROFEN 200 MG
600 TABLET ORAL ONCE
Refills: 0 | Status: COMPLETED | OUTPATIENT
Start: 2020-04-30 | End: 2020-04-30

## 2020-04-30 RX ORDER — IBUPROFEN 200 MG
1 TABLET ORAL
Qty: 15 | Refills: 0
Start: 2020-04-30

## 2020-04-30 RX ORDER — METOCLOPRAMIDE HCL 10 MG
10 TABLET ORAL ONCE
Refills: 0 | Status: COMPLETED | OUTPATIENT
Start: 2020-04-30 | End: 2020-04-30

## 2020-04-30 RX ORDER — METOCLOPRAMIDE HCL 10 MG
1 TABLET ORAL
Qty: 10 | Refills: 0
Start: 2020-04-30

## 2020-04-30 RX ADMIN — Medication 600 MILLIGRAM(S): at 08:10

## 2020-04-30 RX ADMIN — Medication 10 MILLIGRAM(S): at 08:12

## 2020-04-30 NOTE — ED PEDIATRIC NURSE NOTE - OBJECTIVE STATEMENT
Pt. presents to the ED c/o persistent "intermittent" headaches for weeks. Pt. states that she was seen and discharged from the ED yesterday with the same symptoms. Pt. states that she was dx with COVID-19 in "March" and "hasn't been right since". Pt. states that she had an MRI approx 2 weeks ago d/t blurred vision. Pt. requesting repeat MRI at this time. Pt. enters the ED drinking tea and apparently comfortable. Pt. states that she took "2 Tylenol" PTA that helped "a whole lot" . Pt. denies HX of Migraine and states that she "may have to follow up with a neurologist".

## 2020-04-30 NOTE — ED PROVIDER NOTE - NSFOLLOWUPINSTRUCTIONS_ED_ALL_ED_FT
Continue taking tylenol or motrin for pain and hydrate with plenty of water.  Call to make appointment with neurologist Dr. Zepeda, we will have referrals coordinator help with scheduling appointment.  You may call our referrals coordinator at [phone] Monday to Friday 11am-7pm for assistance with making an appointment    Headache    A headache is pain or discomfort felt around the head or neck area. The specific cause of a headache may not be found as there are many types including tension headaches, migraine headaches, and cluster headaches. Watch your condition for any changes. Things you can do to manage your pain include taking over the counter and prescription medications as instructed by your health care provider, lying down in a dark quiet room, limiting stress, getting regular sleep, and refraining from alcohol and tobacco products.    SEEK IMMEDIATE MEDICAL CARE IF YOU HAVE ANY OF THE FOLLOWING SYMPTOMS: fever, vomiting, stiff neck, loss of vision, problems with speech, muscle weakness, loss of balance, trouble walking, passing out, or confusion.

## 2020-04-30 NOTE — ED PROVIDER NOTE - ATTENDING CONTRIBUTION TO CARE
23 yo F recently dx with COVID presenting with intermittent headaches, frontal, nonradiating x several weeks.  Pt reports lack of sleep, anxiety.  Pt looks well, HD stable, afebrile, supple neck w/o meningeal signs.  Nl neuro exam and gait. Pt slightly anxious upon exam.  Reviewed extensive workup for similar recently during prior ED visit.  Doubt complication from COVID, SAH, DST.  Plan reassurance, dc and outpt fu.

## 2020-04-30 NOTE — ED PEDIATRIC NURSE NOTE - VOIDING
Rest at home  Extra liquids to drink  Tylenol and/or ibuprofen for fever 
without difficulty/per patient report

## 2020-04-30 NOTE — ED PROVIDER NOTE - OBJECTIVE STATEMENT
24 F pmh anxiety, recently diagnosed COVID-19 infection at end of March p/w headache and generalized fatigue.  Pt reports dull aching diffuse headache waxes/weans, improves with tylenol but never fully resolves; also w/ associated nausea- no vomiting.  She was seen in ED yesterday with same complaint, of note many ED visits after covid dx with unremarkable labs/CT head and MRI brain mid April.  Pt concerned as she still does not feel back to her normal self.  Denies fever, chills, dizziness, fainting, vision changes, dysarthria, dysphagia, neck pain, chest pain, sob, abd pain, diarrhea, urinary sxs, trauma/fall.  Pt reports she lives with her boyfriend and feels safe at home

## 2020-04-30 NOTE — ED PROVIDER NOTE - PHYSICAL EXAMINATION
Vitals reviewed  Gen: anxious appearing but nad, speaking in full sentences  Skin: wwp, no rash/lesions  HEENT: ncat, eomi, perrla, mmm  CV: rrr, no audible m/r/g  Resp: symmetrical expansion, ctab, no w/r/r  Abd: nondistended, soft/nt  Ext: FROM throughout, no peripheral edema, 5/5 strength in all ext, distal sensation intact  Neuro: alert/oriented, no focal deficits, steady gait- no ataxia, negative rhomberg

## 2020-04-30 NOTE — ED PROVIDER NOTE - CLINICAL SUMMARY MEDICAL DECISION MAKING FREE TEXT BOX
24 F pmh anxiety, recently diagnosed COVID-19 infection at end of March p/w headache and generalized fatigue;  pt with multiple ED visits s/p covid dx, had labs which were normal yesterday and CThead/MRI mid April- both unremarkable.  Concerned due to recurrence of headaches but improve with tylenol.  unremarkable neuro exam- no concerning sxs. pt sxs improved after meds.  concern for secondary gain due to multiple ED visits, but reports she lives with boyfriend and feels safe at home.  instructed to continue OTC meds/supportive care and get plenty of rest.  to follow up with Dr. Zepeda neuro outpt.  discussed strict return parameters.

## 2020-04-30 NOTE — ED PEDIATRIC NURSE NOTE - CHPI ED NUR SYMPTOMS NEG
no fever/no change in level of consciousness/no confusion/no vomiting/no dizziness/no loss of consciousness/no blurred vision/no nausea/no numbness/no weakness

## 2020-04-30 NOTE — ED PROVIDER NOTE - PATIENT PORTAL LINK FT
You can access the FollowMyHealth Patient Portal offered by [facility] by registering at the following website: [email or web address]. By joining [employer]’s FollowMyHealth portal, you will also be able to view your health information using other applications (apps) compatible with our system.

## 2020-05-01 ENCOUNTER — APPOINTMENT (OUTPATIENT)
Dept: NEUROLOGY | Facility: CLINIC | Age: 25
End: 2020-05-01
Payer: MEDICAID

## 2020-05-01 ENCOUNTER — EMERGENCY (EMERGENCY)
Facility: HOSPITAL | Age: 25
LOS: 1 days | Discharge: HOME | End: 2020-05-01
Attending: EMERGENCY MEDICINE | Admitting: EMERGENCY MEDICINE
Payer: COMMERCIAL

## 2020-05-01 ENCOUNTER — TRANSCRIPTION ENCOUNTER (OUTPATIENT)
Age: 25
End: 2020-05-01

## 2020-05-01 VITALS
OXYGEN SATURATION: 98 % | DIASTOLIC BLOOD PRESSURE: 76 MMHG | SYSTOLIC BLOOD PRESSURE: 109 MMHG | TEMPERATURE: 98 F | HEART RATE: 87 BPM | RESPIRATION RATE: 18 BRPM

## 2020-05-01 VITALS
HEIGHT: 63 IN | OXYGEN SATURATION: 98 % | HEART RATE: 89 BPM | TEMPERATURE: 98 F | DIASTOLIC BLOOD PRESSURE: 84 MMHG | SYSTOLIC BLOOD PRESSURE: 130 MMHG | WEIGHT: 141.1 LBS | RESPIRATION RATE: 18 BRPM

## 2020-05-01 PROCEDURE — 96375 TX/PRO/DX INJ NEW DRUG ADDON: CPT

## 2020-05-01 PROCEDURE — 99284 EMERGENCY DEPT VISIT MOD MDM: CPT

## 2020-05-01 PROCEDURE — 99284 EMERGENCY DEPT VISIT MOD MDM: CPT | Mod: 25

## 2020-05-01 PROCEDURE — 36415 COLL VENOUS BLD VENIPUNCTURE: CPT

## 2020-05-01 PROCEDURE — 86147 CARDIOLIPIN ANTIBODY EA IG: CPT

## 2020-05-01 PROCEDURE — 99213 OFFICE O/P EST LOW 20 MIN: CPT | Mod: 95

## 2020-05-01 PROCEDURE — 96365 THER/PROPH/DIAG IV INF INIT: CPT

## 2020-05-01 RX ORDER — KETOROLAC TROMETHAMINE 30 MG/ML
30 SYRINGE (ML) INJECTION ONCE
Refills: 0 | Status: DISCONTINUED | OUTPATIENT
Start: 2020-05-01 | End: 2020-05-01

## 2020-05-01 RX ORDER — SODIUM CHLORIDE 9 MG/ML
1000 INJECTION INTRAMUSCULAR; INTRAVENOUS; SUBCUTANEOUS ONCE
Refills: 0 | Status: COMPLETED | OUTPATIENT
Start: 2020-05-01 | End: 2020-05-01

## 2020-05-01 RX ORDER — DIPHENHYDRAMINE HCL 50 MG
25 CAPSULE ORAL ONCE
Refills: 0 | Status: COMPLETED | OUTPATIENT
Start: 2020-05-01 | End: 2020-05-01

## 2020-05-01 RX ORDER — METOCLOPRAMIDE HCL 10 MG
10 TABLET ORAL ONCE
Refills: 0 | Status: COMPLETED | OUTPATIENT
Start: 2020-05-01 | End: 2020-05-01

## 2020-05-01 RX ADMIN — Medication 104 MILLIGRAM(S): at 15:42

## 2020-05-01 RX ADMIN — Medication 10 MILLIGRAM(S): at 17:03

## 2020-05-01 RX ADMIN — Medication 25 MILLIGRAM(S): at 15:42

## 2020-05-01 RX ADMIN — SODIUM CHLORIDE 1000 MILLILITER(S): 9 INJECTION INTRAMUSCULAR; INTRAVENOUS; SUBCUTANEOUS at 15:42

## 2020-05-01 RX ADMIN — Medication 30 MILLIGRAM(S): at 15:42

## 2020-05-01 RX ADMIN — SODIUM CHLORIDE 1000 MILLILITER(S): 9 INJECTION INTRAMUSCULAR; INTRAVENOUS; SUBCUTANEOUS at 17:03

## 2020-05-01 NOTE — ED PROVIDER NOTE - CLINICAL SUMMARY MEDICAL DECISION MAKING FREE TEXT BOX
Patient with no focal motor or sensory deficit. Tx as a migraine feeling better. Case discussed with DR. Zepeda will do antiphospholipid testing and pt is to be d/c. As per Dr. Zepeda he will follow test results.

## 2020-05-01 NOTE — ED PROVIDER NOTE - EYES, MLM
Clear bilaterally, pupils equal, round and reactive to light. extraocular movement intact. no nystagmus

## 2020-05-01 NOTE — HISTORY OF PRESENT ILLNESS
[Home] : at home, [unfilled] , at the time of the visit. [Other Location: e.g. Home (Enter Location, City,State)___] : at [unfilled] [Patient] : the patient [FreeTextEntry1] : 24 year old with migraine and COVID symptoms \par  Headache - migraine for days - scheduled for MRI in the next 24 hrs- was seen at [facility] ER-\par  She had D dimer - MRI head, CT all normal\par \par Slight vertigo and mild slowness of thought \par \par Awake and responsive to commands \par  CN- full EOM- no nystagmus\par  No tremors or dysmetria\par  No Cranial neve deficit\par  Gait - no foot drop\par  Full ROM of the neck\par \par COVID symptoms and migraine \par  She was told to go to the ER- I spoke to ER- Dr and PA\par  She will be treated for migraine\par  MRI is schedules as OP on Saturday\par antiphospholipid was drawn

## 2020-05-01 NOTE — ED PROVIDER NOTE - ATTENDING CONTRIBUTION TO CARE
24 F co ha diffuse ct and mri recently were neg  typical ha for her + for covid on March 29  vss  s1s2 lungs cta bl  abd soft nt nd +bs  ext no c/c/e  IMP- HA  pain control, reasses

## 2020-05-01 NOTE — ED ADULT NURSE NOTE - NS ED NURSE RECORD ANOTHER VITAL SIGN
[FreeTextEntry1] : Patient with gastroesophageal reflux and intestinal metaplasia GE junction. He is clinically doing much better on pantoprazole 40 mg daily he still has very minimal symptoms however.\par \par Plan continue pantoprazole 40 mg b.i.d. encourage patient to lose weight both for his reflux and because of his elevated blood pressure history of steatosis.\par \par He will contact me in a month and if continues to improve will decrease pantoprazole 40 mg once daily Yes

## 2020-05-01 NOTE — ED PROVIDER NOTE - OBJECTIVE STATEMENT
25 y/o F with no PMHx have multiple visits in the ED within the last few months (18 times). Full extensive workup done that shows negative CT, MRI, cxr. + COVID on March 29th. Seen 2 days ago now presenting with persistent HA, nausea, and vomiting. Also here for repeat MRI. No fever, cold sxs, CP. SOB, numbness/ tingling.

## 2020-05-01 NOTE — ED ADULT NURSE NOTE - OBJECTIVE STATEMENT
pt c/o headache , nausea and balance problems , had covid a month ago , told to come here by her neurologist

## 2020-05-01 NOTE — ED PROVIDER NOTE - PATIENT PORTAL LINK FT
You can access the FollowMyHealth Patient Portal offered by [facility] by registering at the following website: [email or web address]. By joining [employer]’s FollowMyHealth portal, you will also be able to view your health information using other applications (apps) compatible with our system.

## 2020-05-01 NOTE — ED PROVIDER NOTE - NEUROLOGICAL, MLM
Alert and oriented, no focal deficits, no motor or sensory deficits. muscle 5/5 b/l. coordination and balance intact. Ambulating

## 2020-05-01 NOTE — ED ADULT TRIAGE NOTE - CHIEF COMPLAINT QUOTE
25 y/o female came in to ED for evaluation of headache for 4 days and difficulty with balance for 4 days, Pt denies dizziness, blurry vision, numbness or any other complaints. Pt reports that she was diagnosed with covid 19 on 3/29/20. Pt states she spoke with her neurologist and was told to come to ED for evaluation. Pt noted ambulating with steady gait to bathroom in ED.

## 2020-05-01 NOTE — ED ADULT NURSE NOTE - BREATHING
----- Message from Emily Contreras sent at 8/17/2017 11:43 AM CDT -----  Contact: Dr. Lluvia iRck  Good afternoon,    Dr. Rick would like a call back regarding starting pt on an antidepressant Celexa Dr. Rick just wanted to know if this okay with the other medications the pt is currently taking. (she wanted me to send the message straight to you)    Dr. Rick can be reached at [phone].    Thank you!   spontaneous

## 2020-05-03 DIAGNOSIS — R53.1 WEAKNESS: ICD-10-CM

## 2020-05-03 DIAGNOSIS — R51 HEADACHE: ICD-10-CM

## 2020-05-04 ENCOUNTER — APPOINTMENT (OUTPATIENT)
Dept: NEUROLOGY | Facility: CLINIC | Age: 25
End: 2020-05-04
Payer: MEDICAID

## 2020-05-04 DIAGNOSIS — R51 HEADACHE: ICD-10-CM

## 2020-05-04 DIAGNOSIS — R41.82 ALTERED MENTAL STATUS, UNSPECIFIED: ICD-10-CM

## 2020-05-04 DIAGNOSIS — U07.1 COVID-19: ICD-10-CM

## 2020-05-04 DIAGNOSIS — Z79.899 OTHER LONG TERM (CURRENT) DRUG THERAPY: ICD-10-CM

## 2020-05-04 DIAGNOSIS — R53.83 OTHER FATIGUE: ICD-10-CM

## 2020-05-04 DIAGNOSIS — Z79.51 LONG TERM (CURRENT) USE OF INHALED STEROIDS: ICD-10-CM

## 2020-05-04 DIAGNOSIS — G43.709 CHRONIC MIGRAINE W/OUT AURA, NOT INTRACTABLE, W/OUT STATUS MIGRAINOSUS: ICD-10-CM

## 2020-05-04 DIAGNOSIS — Z20.828 CONTACT WITH AND (SUSPECTED) EXPOSURE TO OTHER VIRAL COMMUNICABLE DISEASES: ICD-10-CM

## 2020-05-04 DIAGNOSIS — M79.10 MYALGIA, UNSPECIFIED SITE: ICD-10-CM

## 2020-05-04 PROCEDURE — 99212 OFFICE O/P EST SF 10 MIN: CPT | Mod: 95

## 2020-05-04 NOTE — HISTORY OF PRESENT ILLNESS
[Home] : at home, [unfilled] , at the time of the visit. [Other Location: e.g. Home (Enter Location, City,State)___] : at [unfilled] [Patient] : the patient [FreeTextEntry1] : 24 year old COVID positive- headache less but continued unsteadiness and lightheaded- poor sleeping \par  No chest pain or SOB\par  She was in the ER May 1 st- given Reglan for migraine\par  Some neck stiffness and crackling.\par \par Awake and alert\par  Full EOM\par  Motor normal \par  Gait positive Romberg\par  Mild tremor - and slight dysmetria \par Full ROM of the neck\par \par R/O COVID related disease with cranial nerves and brain involvement \par  She will proceed with MRI head with contrast\par  I spoke to Dr Strong

## 2020-05-05 DIAGNOSIS — R11.2 NAUSEA WITH VOMITING, UNSPECIFIED: ICD-10-CM

## 2020-05-05 DIAGNOSIS — G43.909 MIGRAINE, UNSPECIFIED, NOT INTRACTABLE, WITHOUT STATUS MIGRAINOSUS: ICD-10-CM

## 2020-05-05 DIAGNOSIS — R51 HEADACHE: ICD-10-CM

## 2020-05-06 LAB — CARDIOLIPIN AB SER-ACNC: NEGATIVE — SIGNIFICANT CHANGE UP

## 2020-05-11 ENCOUNTER — APPOINTMENT (OUTPATIENT)
Dept: NEUROLOGY | Facility: CLINIC | Age: 25
End: 2020-05-11
Payer: MEDICAID

## 2020-05-11 DIAGNOSIS — G43.909 MIGRAINE, UNSPECIFIED, NOT INTRACTABLE, W/OUT STATUS MIGRAINOSUS: ICD-10-CM

## 2020-05-11 PROCEDURE — 99212 OFFICE O/P EST SF 10 MIN: CPT | Mod: 95

## 2020-05-11 NOTE — HISTORY OF PRESENT ILLNESS
[Other Location: e.g. Home (Enter Location, City,State)___] : at [unfilled] [Patient] : the patient [Home] : at home, [unfilled] , at the time of the visit. [FreeTextEntry1] : 24 year old with migraine - no new vertigo- still feels lightheadedness-\par  No chest pain- pulse 49 and palpitations \par reviewed MRI head - COVID negative \par \par Awake and alert \par  Full EOM\par  No nystagmus \par  Full ROM of the neck\par No tremors or dysmetria\par No unsteadiness with gait \par \par Migraine - FU with Dr Ellis \par \par

## 2020-05-12 ENCOUNTER — APPOINTMENT (OUTPATIENT)
Dept: HEART AND VASCULAR | Facility: CLINIC | Age: 25
End: 2020-05-12
Payer: MEDICAID

## 2020-05-12 DIAGNOSIS — R00.1 BRADYCARDIA, UNSPECIFIED: ICD-10-CM

## 2020-05-12 DIAGNOSIS — R07.1 CHEST PAIN ON BREATHING: ICD-10-CM

## 2020-05-12 PROCEDURE — 99442: CPT

## 2020-05-17 NOTE — HISTORY OF PRESENT ILLNESS
[Other Location: e.g. Home (Enter Location, City,State)___] : at [unfilled] [Home] : at home, [unfilled] , at the time of the visit. [Patient] : the patient [FreeTextEntry1] : 24 year old with abdominal wall pain - saw GI doct

## 2020-05-20 ENCOUNTER — APPOINTMENT (OUTPATIENT)
Dept: HEART AND VASCULAR | Facility: CLINIC | Age: 25
End: 2020-05-20
Payer: MEDICAID

## 2020-05-20 PROCEDURE — 0296T: CPT

## 2020-05-20 PROCEDURE — 0298T: CPT

## 2020-05-20 PROCEDURE — 93306 TTE W/DOPPLER COMPLETE: CPT

## 2020-05-21 ENCOUNTER — EMERGENCY (EMERGENCY)
Facility: HOSPITAL | Age: 25
LOS: 1 days | Discharge: HOME | End: 2020-05-21
Attending: EMERGENCY MEDICINE | Admitting: EMERGENCY MEDICINE
Payer: COMMERCIAL

## 2020-05-21 VITALS
HEART RATE: 78 BPM | RESPIRATION RATE: 20 BRPM | DIASTOLIC BLOOD PRESSURE: 70 MMHG | SYSTOLIC BLOOD PRESSURE: 99 MMHG | TEMPERATURE: 98 F | OXYGEN SATURATION: 100 %

## 2020-05-21 VITALS
TEMPERATURE: 99 F | RESPIRATION RATE: 18 BRPM | WEIGHT: 139.99 LBS | DIASTOLIC BLOOD PRESSURE: 83 MMHG | HEART RATE: 101 BPM | SYSTOLIC BLOOD PRESSURE: 132 MMHG | HEIGHT: 63 IN | OXYGEN SATURATION: 100 %

## 2020-05-21 DIAGNOSIS — N76.0 ACUTE VAGINITIS: ICD-10-CM

## 2020-05-21 DIAGNOSIS — R10.2 PELVIC AND PERINEAL PAIN: ICD-10-CM

## 2020-05-21 DIAGNOSIS — U07.1 COVID-19: ICD-10-CM

## 2020-05-21 LAB
ALBUMIN SERPL ELPH-MCNC: 4.2 G/DL — SIGNIFICANT CHANGE UP (ref 3.3–5)
ALP SERPL-CCNC: 65 U/L — SIGNIFICANT CHANGE UP (ref 40–120)
ALT FLD-CCNC: 9 U/L — LOW (ref 10–45)
ANION GAP SERPL CALC-SCNC: 13 MMOL/L — SIGNIFICANT CHANGE UP (ref 5–17)
APPEARANCE UR: CLEAR — SIGNIFICANT CHANGE UP
AST SERPL-CCNC: 16 U/L — SIGNIFICANT CHANGE UP (ref 10–40)
BASOPHILS # BLD AUTO: 0.06 K/UL — SIGNIFICANT CHANGE UP (ref 0–0.2)
BASOPHILS NFR BLD AUTO: 0.8 % — SIGNIFICANT CHANGE UP (ref 0–2)
BILIRUB SERPL-MCNC: 0.2 MG/DL — SIGNIFICANT CHANGE UP (ref 0.2–1.2)
BILIRUB UR-MCNC: NEGATIVE — SIGNIFICANT CHANGE UP
BUN SERPL-MCNC: 7 MG/DL — SIGNIFICANT CHANGE UP (ref 7–23)
CALCIUM SERPL-MCNC: 9 MG/DL — SIGNIFICANT CHANGE UP (ref 8.4–10.5)
CHLORIDE SERPL-SCNC: 106 MMOL/L — SIGNIFICANT CHANGE UP (ref 96–108)
CO2 SERPL-SCNC: 25 MMOL/L — SIGNIFICANT CHANGE UP (ref 22–31)
COLOR SPEC: YELLOW — SIGNIFICANT CHANGE UP
CREAT SERPL-MCNC: 0.69 MG/DL — SIGNIFICANT CHANGE UP (ref 0.5–1.3)
DIFF PNL FLD: NEGATIVE — SIGNIFICANT CHANGE UP
EOSINOPHIL # BLD AUTO: 0.33 K/UL — SIGNIFICANT CHANGE UP (ref 0–0.5)
EOSINOPHIL NFR BLD AUTO: 4.3 % — SIGNIFICANT CHANGE UP (ref 0–6)
GLUCOSE SERPL-MCNC: 74 MG/DL — SIGNIFICANT CHANGE UP (ref 70–99)
GLUCOSE UR QL: NEGATIVE — SIGNIFICANT CHANGE UP
HCT VFR BLD CALC: 40.1 % — SIGNIFICANT CHANGE UP (ref 34.5–45)
HGB BLD-MCNC: 13.6 G/DL — SIGNIFICANT CHANGE UP (ref 11.5–15.5)
IMM GRANULOCYTES NFR BLD AUTO: 0.4 % — SIGNIFICANT CHANGE UP (ref 0–1.5)
KETONES UR-MCNC: NEGATIVE — SIGNIFICANT CHANGE UP
LEUKOCYTE ESTERASE UR-ACNC: ABNORMAL
LYMPHOCYTES # BLD AUTO: 2.1 K/UL — SIGNIFICANT CHANGE UP (ref 1–3.3)
LYMPHOCYTES # BLD AUTO: 27.4 % — SIGNIFICANT CHANGE UP (ref 13–44)
MCHC RBC-ENTMCNC: 28.8 PG — SIGNIFICANT CHANGE UP (ref 27–34)
MCHC RBC-ENTMCNC: 33.9 GM/DL — SIGNIFICANT CHANGE UP (ref 32–36)
MCV RBC AUTO: 84.8 FL — SIGNIFICANT CHANGE UP (ref 80–100)
MONOCYTES # BLD AUTO: 0.65 K/UL — SIGNIFICANT CHANGE UP (ref 0–0.9)
MONOCYTES NFR BLD AUTO: 8.5 % — SIGNIFICANT CHANGE UP (ref 2–14)
NEUTROPHILS # BLD AUTO: 4.5 K/UL — SIGNIFICANT CHANGE UP (ref 1.8–7.4)
NEUTROPHILS NFR BLD AUTO: 58.6 % — SIGNIFICANT CHANGE UP (ref 43–77)
NITRITE UR-MCNC: NEGATIVE — SIGNIFICANT CHANGE UP
NRBC # BLD: 0 /100 WBCS — SIGNIFICANT CHANGE UP (ref 0–0)
PH UR: 6 — SIGNIFICANT CHANGE UP (ref 5–8)
PLATELET # BLD AUTO: 230 K/UL — SIGNIFICANT CHANGE UP (ref 150–400)
POTASSIUM SERPL-MCNC: 4.5 MMOL/L — SIGNIFICANT CHANGE UP (ref 3.5–5.3)
POTASSIUM SERPL-SCNC: 4.5 MMOL/L — SIGNIFICANT CHANGE UP (ref 3.5–5.3)
PROT SERPL-MCNC: 6.9 G/DL — SIGNIFICANT CHANGE UP (ref 6–8.3)
PROT UR-MCNC: NEGATIVE MG/DL — SIGNIFICANT CHANGE UP
RBC # BLD: 4.73 M/UL — SIGNIFICANT CHANGE UP (ref 3.8–5.2)
RBC # FLD: 12.4 % — SIGNIFICANT CHANGE UP (ref 10.3–14.5)
SODIUM SERPL-SCNC: 144 MMOL/L — SIGNIFICANT CHANGE UP (ref 135–145)
SP GR SPEC: 1.02 — SIGNIFICANT CHANGE UP (ref 1–1.03)
UROBILINOGEN FLD QL: 0.2 E.U./DL — SIGNIFICANT CHANGE UP
WBC # BLD: 7.67 K/UL — SIGNIFICANT CHANGE UP (ref 3.8–10.5)
WBC # FLD AUTO: 7.67 K/UL — SIGNIFICANT CHANGE UP (ref 3.8–10.5)

## 2020-05-21 PROCEDURE — 81001 URINALYSIS AUTO W/SCOPE: CPT

## 2020-05-21 PROCEDURE — 87184 SC STD DISK METHOD PER PLATE: CPT

## 2020-05-21 PROCEDURE — 80053 COMPREHEN METABOLIC PANEL: CPT

## 2020-05-21 PROCEDURE — 85025 COMPLETE CBC W/AUTO DIFF WBC: CPT

## 2020-05-21 PROCEDURE — 87086 URINE CULTURE/COLONY COUNT: CPT

## 2020-05-21 PROCEDURE — 99284 EMERGENCY DEPT VISIT MOD MDM: CPT

## 2020-05-21 PROCEDURE — 87491 CHLMYD TRACH DNA AMP PROBE: CPT

## 2020-05-21 PROCEDURE — 36415 COLL VENOUS BLD VENIPUNCTURE: CPT

## 2020-05-21 PROCEDURE — 87591 N.GONORRHOEAE DNA AMP PROB: CPT

## 2020-05-21 RX ORDER — FLUCONAZOLE 150 MG/1
1 TABLET ORAL
Qty: 1 | Refills: 1
Start: 2020-05-21 | End: 2020-05-22

## 2020-05-21 RX ORDER — IBUPROFEN 200 MG
600 TABLET ORAL ONCE
Refills: 0 | Status: COMPLETED | OUTPATIENT
Start: 2020-05-21 | End: 2020-05-21

## 2020-05-21 RX ORDER — METRONIDAZOLE 500 MG
1 TABLET ORAL
Qty: 14 | Refills: 0
Start: 2020-05-21 | End: 2020-05-27

## 2020-05-21 RX ADMIN — Medication 600 MILLIGRAM(S): at 20:50

## 2020-05-21 NOTE — ED ADULT NURSE NOTE - NS_SISCREENINGSR_GEN_ALL_ED
The patient has been examined and the H&P has been reviewed:    I concur with the findings and no changes have occurred since H&P was written.    Anesthesia/Surgery risks, benefits and alternative options discussed and understood by patient/family.          Active Hospital Problems    Diagnosis  POA    Complications of transplanted lung [T86.819]  Yes     Dx updated per 2019 IMO Load        Resolved Hospital Problems   No resolved problems to display.      Negative

## 2020-05-21 NOTE — ED PROVIDER NOTE - OBJECTIVE STATEMENT
23 yo F with anxiety, covid + from 3/29 w/ multiple [facility] ED visits w/ neg labs/trop/ddimer/rvp, CXR, head Ct and MRI, present to ER c/o vaginal discharge and midpelvic pain x 4 days. pt reports she tried OTC medication for yeast infection, but had no improvement of symptoms. pt c/o soreness and burning sensation in her vagina, c/o discomfort with urination. Pt denies n/v/d/c, denies hematuria, blood in her stool, denies appetite changes, fever or chills. Pt denies h/o STI's or increased risks for STI.

## 2020-05-21 NOTE — ED PROVIDER NOTE - CROS ED ROS STATEMENT
FREE:[LAST:[[facility]],PHONE:[(   )    -],FAX:[(   )    -]]
all other ROS negative except as per HPI

## 2020-05-21 NOTE — ED PROVIDER NOTE - PATIENT PORTAL LINK FT
You can access the FollowMyHealth Patient Portal offered by [facility] by registering at the following website: [email or web address]. By joining [employer]’s FollowMyHealth portal, you will also be able to view your health information using other applications (apps) compatible with our system.

## 2020-05-21 NOTE — ED PROVIDER NOTE - NSFOLLOWUPINSTRUCTIONS_ED_ALL_ED_FT
Bacterial Vaginosis     Bacterial vaginosis is a vaginal infection that occurs when the normal balance of bacteria in the vagina is disrupted. It results from an overgrowth of certain bacteria. This is the most common vaginal infection among women ages 15–44.  Because bacterial vaginosis increases your risk for STIs (sexually transmitted infections), getting treated can help reduce your risk for chlamydia, gonorrhea, herpes, and HIV (human immunodeficiency virus). Treatment is also important for preventing complications in pregnant women, because this condition can cause an early (premature) delivery.  What are the causes?  This condition is caused by an increase in harmful bacteria that are normally present in small amounts in the vagina. However, the reason that the condition develops is not fully understood.  What increases the risk?  The following factors may make you more likely to develop this condition:  Having a new sexual partner or multiple sexual partners.Having unprotected sex.Douching.Having an intrauterine device (IUD).Smoking.Drug and alcohol abuse.Taking certain antibiotic medicines.Being pregnant.You cannot get bacterial vaginosis from toilet seats, bedding, swimming pools, or contact with objects around you.  What are the signs or symptoms?  Symptoms of this condition include:  Grey or white vaginal discharge. The discharge can also be watery or foamy.A fish-like odor with discharge, especially after sexual intercourse or during menstruation.Itching in and around the vagina.Burning or pain with urination.Some women with bacterial vaginosis have no signs or symptoms.  How is this diagnosed?  This condition is diagnosed based on:  Your medical history.A physical exam of the vagina.Testing a sample of vaginal fluid under a microscope to look for a large amount of bad bacteria or abnormal cells. Your health care provider may use a cotton swab or a small wooden spatula to collect the sample.How is this treated?  This condition is treated with antibiotics. These may be given as a pill, a vaginal cream, or a medicine that is put into the vagina (suppository). If the condition comes back after treatment, a second round of antibiotics may be needed.  Follow these instructions at home:  Medicines     Take over-the-counter and prescription medicines only as told by your health care provider.Take or use your antibiotic as told by your health care provider. Do not stop taking or using the antibiotic even if you start to feel better.General instructions     If you have a female sexual partner, tell her that you have a vaginal infection. She should see her health care provider and be treated if she has symptoms. If you have a male sexual partner, he does not need treatment.During treatment:  Avoid sexual activity until you finish treatment.Do not douche.Avoid alcohol as directed by your health care provider.Avoid breastfeeding as directed by your health care provider.Drink enough water and fluids to keep your urine clear or pale yellow.Keep the area around your vagina and rectum clean.  Wash the area daily with warm water.Wipe yourself from front to back after using the toilet.Keep all follow-up visits as told by your health care provider. This is important.How is this prevented?  Do not douche.Wash the outside of your vagina with warm water only.Use protection when having sex. This includes latex condoms and dental dams.Limit how many sexual partners you have. To help prevent bacterial vaginosis, it is best to have sex with just one partner (monogamous).Make sure you and your sexual partner are tested for STIs.Wear cotton or cotton-lined underwear.Avoid wearing tight pants and pantyhose, especially during summer.Limit the amount of alcohol that you drink.Do not use any products that contain nicotine or tobacco, such as cigarettes and e-cigarettes. If you need help quitting, ask your health care provider.Do not use illegal drugs.Where to find more information  Centers for Disease Control and Prevention: www.cdc.gov/stdAmerican Sexual Health Association (RONEY): www.ashastd.orgU.S. Department of Health and Human Services, Office on Women's Health: www.womenshealth.gov/ or https://www.womenshealth.gov/a-z-topics/bacterial-vaginosisContact a health care provider if:  Your symptoms do not improve, even after treatment.You have more discharge or pain when urinating.You have a fever.You have pain in your abdomen.You have pain during sex.You have vaginal bleeding between periods.Summary  Bacterial vaginosis is a vaginal infection that occurs when the normal balance of bacteria in the vagina is disrupted.Because bacterial vaginosis increases your risk for STIs (sexually transmitted infections), getting treated can help reduce your risk for chlamydia, gonorrhea, herpes, and HIV (human immunodeficiency virus). Treatment is also important for preventing complications in pregnant women, because the condition can cause an early (premature) delivery.This condition is treated with antibiotic medicines. These may be given as a pill, a vaginal cream, or a medicine that is put into the vagina (suppository).This information is not intended to replace advice given to you by your health care provider. Make sure you discuss any questions you have with your health care provider.

## 2020-05-21 NOTE — ED PROVIDER NOTE - CLINICAL SUMMARY MEDICAL DECISION MAKING FREE TEXT BOX
23 yo female with vaginal discharge, discomfort and burning sensations x 4 days. Well appearing, afebrile, with benign abdomen and pelvic exam suspicious for BV, No clinical findings to suspect PID, pt has no tenderness on exam, denies any increased risks for STI's and reports she has not been sex. active for the past few month because she had covid-19+ at the end of March. Pt is not pregnant, period regular, basic labs- WNL. will treat BV and d/c home for out pt f/u with GYN. returned precautions discussed. pt agrees with a plan.

## 2020-05-21 NOTE — ED PROVIDER NOTE - CARE PROVIDERS DIRECT ADDRESSES
[email or web address]

## 2020-05-21 NOTE — ED ADULT TRIAGE NOTE - OTHER COMPLAINTS
+burning urination, +white discharge, no bleeding, LMP was 2 weeks ago. As per pt, pt was tested positive for covid in March.

## 2020-05-21 NOTE — ED ADULT NURSE NOTE - OBJECTIVE STATEMENT
Pt presents to ER with c/o pelvic and vaginal pain for about a week. Pt reports negative UTI urine test a few days ago. Pt reports pain to suprapubic area that radiates to periumbilical area, vaginal area, burning upon urination, fevers. Pt states she was COVID+ mid March and continues to have intermittent phlegm. Pt also endorses whitish vaginal discharge.

## 2020-05-21 NOTE — ED PROVIDER NOTE - CARE PROVIDER_API CALL
Stevenson Arizmendi)  OBGYN  [address], [facility] [place] [address] [place] [address]  Phone: [phone]  Fax: [phone]  Follow Up Time:     Venecia Samuels  OBSTETRICS AND GYNECOLOGY  [address] [address]  Phone: [phone]  Fax: [phone]  Follow Up Time:

## 2020-05-21 NOTE — ED ADULT NURSE NOTE - NSIMPLEMENTINTERV_GEN_ALL_ED
Implemented All Universal Safety Interventions:  [place] to call system. Call bell, personal items and telephone within reach. Instruct patient to call for assistance. Room bathroom lighting operational. Non-slip footwear when patient is off stretcher. Physically safe environment: no spills, clutter or unnecessary equipment. Stretcher in lowest position, wheels locked, appropriate side rails in place.

## 2020-05-22 LAB
C TRACH RRNA SPEC QL NAA+PROBE: SIGNIFICANT CHANGE UP
N GONORRHOEA RRNA SPEC QL NAA+PROBE: SIGNIFICANT CHANGE UP
SPECIMEN SOURCE: SIGNIFICANT CHANGE UP

## 2020-05-23 LAB
-  AMPICILLIN: SIGNIFICANT CHANGE UP
-  CLINDAMYCIN: SIGNIFICANT CHANGE UP
-  ERYTHROMYCIN: SIGNIFICANT CHANGE UP
-  LEVOFLOXACIN: SIGNIFICANT CHANGE UP
-  PENICILLIN: SIGNIFICANT CHANGE UP
-  VANCOMYCIN: SIGNIFICANT CHANGE UP
CULTURE RESULTS: SIGNIFICANT CHANGE UP
METHOD TYPE: SIGNIFICANT CHANGE UP
ORGANISM # SPEC MICROSCOPIC CNT: SIGNIFICANT CHANGE UP
ORGANISM # SPEC MICROSCOPIC CNT: SIGNIFICANT CHANGE UP
SPECIMEN SOURCE: SIGNIFICANT CHANGE UP

## 2020-06-18 PROBLEM — U07.1 COVID-19: Status: ACTIVE | Noted: 2020-05-01

## 2020-12-30 NOTE — ED PROVIDER NOTE - TOBACCO USE
From: Radha NG  To: Claire Escobedo  Sent: 12/14/2020 2:08 PM CST  Subject: EMG Nerve Test     Ms Escobedo,     PUNEET \"My Chart message\" was sent recently to you to choose a Neurologist for an EMG Nerve Test. Were you able to make a decision on who you would like to see?   I will await your response.   Radha Rock [facility]   [phone]   Unknown if ever smoked

## 2021-06-08 NOTE — ED ADULT NURSE NOTE - FINAL NURSING ELECTRONIC SIGNATURE
Placenta previa in second trimester  Has repeat US scheduled for 28 weeks  Dms also scheduled  Rhogam not needed    History of  delivery, currently pregnant- for breech; desires   Repeat Us scheduled       13-Apr-2020 18:08

## 2021-06-25 NOTE — ED PROVIDER NOTE - DISPOSITION TYPE
Refill Approved    Rx renewed per Medication Renewal Policy. Medication was last renewed on 12/11/20.    Richi Zayas, [facility] Triage/Med Refill 6/15/2021     Requested Prescriptions   Pending Prescriptions Disp Refills     simvastatin (ZOCOR) 40 MG tablet [Pharmacy Med Name: SIMVASTATIN 40MG TABLETS] 90 tablet 1     Sig: TAKE 1 TABLET(40 MG) BY MOUTH AT BEDTIME       Statins Refill Protocol (Hmg CoA Reductase Inhibitors) Passed - 6/14/2021  4:48 PM        Passed - PCP or prescribing provider visit in past 12 months      Last office visit with prescriber/PCP: 2/15/2021 Porter Morales DO OR same dept: 2/15/2021 Porter Morales DO OR same specialty: 2/15/2021 Porter Morales DO  Last physical: 7/27/2020 Last MTM visit: Visit date not found   Next visit within 3 mo: Visit date not found  Next physical within 3 mo: Visit date not found  Prescriber OR PCP: Porter Morales DO  Last diagnosis associated with med order: 1. Hyperlipidemia, unspecified hyperlipidemia type  - simvastatin (ZOCOR) 40 MG tablet [Pharmacy Med Name: SIMVASTATIN 40MG TABLETS]; TAKE 1 TABLET(40 MG) BY MOUTH AT BEDTIME  Dispense: 90 tablet; Refill: 1    If protocol passes may refill for 12 months if within 3 months of last provider visit (or a total of 15 months).                             DISCHARGE

## 2021-10-10 NOTE — ED PROVIDER NOTE - NO SIGNIFICANT PAST SURGICAL HISTORY
severe chronic decreased po intake, weight loss, physical findings <<----- Click to add NO significant Past Surgical History

## 2022-09-13 NOTE — ED ADULT NURSE REASSESSMENT NOTE - NS ED NURSE REASSESS COMMENT FT1
Transfer of care acknowledged with bedside rounding. Patient was diagnosed with COVID-19 the end of March. Patient reports a intermittent generalized headache with intermittent nausea. Patient was seen multiple times in [facility] ED. Patient reports prior tx. Benadryl, minimally effective. Pt. denies any CP, SOB, dizziness, vomiting, syncopal episodes.
negative

## 2022-11-04 NOTE — ED ADULT NURSE NOTE - NSFALLRSKASSESASSIST_ED_ALL_ED
OLEGARIO    Name:     NORBERTO BLANKENSHIP  MRN:      [id number]        Account:      [id number]   :      1956           Service Date: 2022       Document: [id number]     no

## 2023-03-09 NOTE — ED PROVIDER NOTE - CROS ED ROS STATEMENT
Medication refilled per prescription protocol. Will route to [facility] for follow up appointment for blood pressure check.  
all other ROS negative except as per HPI

## 2023-03-17 NOTE — ED ADULT NURSE NOTE - RESPIRATORY ASSESSMENT
"     March 18, 2023        Nicolas Kenny MD  [address] [facility]  [address] [place] [id number]             [facility] - On Call  [address]  [facility] [address]  Phone: [phone]  Fax: [phone]   Patient: Maury Smith   MR Number: [id number]   YOB: 1995   Date of Visit: 3/17/2023     Dear Dr. Kenny,     O NT incoming call PES escalation -  Pt reports ideas of hurting herself. Hearing voices to hurt herself Thoughts of Suicide 1 1/2 years ago with treatment in ED and currently followed by Dr. ROMAIN Villafana [facility] psychiatry. Had panic attack today did take ALPRAZolam (XANAX) 1 MG tablet  TID but also took extra 1 mg because she felt as if med was not working fast enough. Suicide Concerns protocol followed and SI procedure started. Maria C  NT team placed call to 911. See separate note. Pt is very calm but has episodes of crying feels she is not "strong" and wants to be stronger emotionally. NT continued on the phone with pt until officer Angella Paredes arrived with EMS service.   Encounter routed to Dr. WYATT Villafana and communication sent to PCP as I was unable to route to PCP.  Reason for Disposition   Patient is threatening suicide now    Additional Information   Negative: Patient attempted suicide    Protocols used: Suicide [id number]      Sincerely,      Antoinette Dias RN            CC  No Recipients    Enclosure         " WDL

## 2023-10-09 NOTE — ED ADULT TRIAGE NOTE - NS ED NOTE AC HIGH RISK COUNTRIES
Neuro Interventional Progress Note    10/09/2023    Crys Gama is a 75 year old year old female patient admitted on 10/6/2023 with left sided facial droop and arm weakness, she had a history of a right ICA flow diverter stent placed for an incidental aneurysm. She then developed multiple right sided strokes over the course of a few months.       Problem List  1. Right MCA territory strokes with left sided weakness.       24 hour events:  Had a run of agitation overnight    24 Hour Vital Signs Summary:  Temperatures:  Current - Temp: 98.4  F (36.9  C); Max - Temp  Av.2  F (36.8  C)  Min: 97.9  F (36.6  C)  Max: 98.4  F (36.9  C)  Respiration range: Resp  Av.8  Min: 7  Max: 31  Pulse range: Pulse  Av.7  Min: 65  Max: 156  Blood pressure range: Systolic (24hrs), Av , Min:96 , Max:177   ; Diastolic (24hrs), Av, Min:70, Max:110    Pulse oximetry range: SpO2  Av.4 %  Min: 95 %  Max: 100 %    Current Medications:   aspirin  325 mg Oral or Feeding Tube Daily    atorvastatin  40 mg Oral or Feeding Tube QPM    clopidogrel  75 mg Oral Daily    dexAMETHasone  4 mg Oral Q6H COOKIE    Followed by    [START ON 10/10/2023] dexAMETHasone  2 mg Oral BID    Followed by    [START ON 10/12/2023] dexAMETHasone  1 mg Oral BID    Followed by    [START ON 10/14/2023] dexAMETHasone  1 mg Oral Daily    donepezil  10 mg Oral or Feeding Tube QPM    mirtazapine  7.5 mg Oral or Feeding Tube At Bedtime    sodium chloride (PF)  3 mL Intracatheter Q8H       PRN Medications:  acetaminophen, - MEDICATION INSTRUCTIONS -, labetalol **OR** hydrALAZINE, lidocaine 4%, lidocaine (buffered or not buffered), - MEDICATION INSTRUCTIONS -, - MEDICATION INSTRUCTIONS -, - MEDICATION INSTRUCTIONS -, ondansetron **OR** ondansetron, prochlorperazine **OR** prochlorperazine **OR** prochlorperazine, QUEtiapine, sodium chloride (PF)    Infusions:   - MEDICATION INSTRUCTIONS -      - MEDICATION INSTRUCTIONS -      - MEDICATION INSTRUCTIONS  -      - MEDICATION INSTRUCTIONS -         Allergies   Allergen Reactions    Azithromycin Hives    Erythromycin Hives    Pcn [Penicillins] Hives    Tetracycline Hives       Physical Examination:    Mental:Awake, alert, attentive, oriented to self, time, place, and circumstance. Language is fluent and coherent with intact comprehension of complex and crossed commands, naming, and repetition. No visuospatial hemineglect.  Cranial Nerves: VFF,EOMI,  face symmetric, tongue midline, no dysarthria, equal shoulder shrug  Motor: No drift in 3/4 extremities, there is 3/5 weakness in left arm abduction, 2/5 in finger extension and 1/5 in finger flexion. Arm extension and flexion are 3/5, no abnormal movements  Sensory: Sensation intact to light touch in 4/4 extremities. No hemisensory neglect.  Cerebellar: No ataxia or dysmetria to finger-to-nose or heel-to-shin testing  Gait: deferred    Labs/Studies:  Recent Labs   Lab Test 10/08/23  0336 10/07/23  1108 10/07/23  0558 10/06/23  1458 10/05/23  1516 08/17/23  1958 08/17/23  1029   NA  --   --  142 142  --   --  141   POTASSIUM  --   --  4.0 4.3  --   --  3.9   CHLORIDE  --   --  108* 107  --   --  105   CO2  --   --  25 26  --   --  26   ANIONGAP  --   --  9 9  --   --  10   * 100* 112* 99  --    < > 99   BUN  --   --  13.7 21.8  --   --  20.3   CR  --   --  0.48* 0.55 0.6  --  0.62   FRANK  --   --  8.7* 9.1  --   --  9.2   WBC  --   --  4.7 4.4  --   --  4.0   RBC  --   --  4.25 4.40  --   --  4.34   HGB  --   --  11.7 12.2  --   --  12.2   PLT  --   --  187 203  --   --  201    < > = values in this interval not displayed.       Recent Labs   Lab Test 10/06/23  1458   INR 0.98   PTT 26         No lab results found in last 7 days.    Assessment/Plan  Left sided weakness- secondary to ischemic strokes in right ICA territory. She had a nickel based flow diverter stent placed- possible etiologies of stroke could be stent malposition or acute allergic reaction to the stent  material resulting in inflammatory response. She is status post angioplasty.  Plan:  -transfer to floor  -Q4h neurochecks  -PRN seroquel at night   -Delirium precautions  -Continue aspirin and plavix for 6 months  -Continue steroids Decadron 4mg with taper.     Tracy Doty MD  Endovascular Surgical Neuroradiology Fellow  [facility]  [phone]    Endovascular Surgical Neuroradiology staff is Dr. Johnson     No

## 2023-12-15 NOTE — ED ADULT NURSE NOTE - DISCHARGE DATE/TIME
[facility]   Advanced Heart Failure Clinic Note    Date of visit: 12/15/2023  Patient Name: Prasanth Archer  Medical Record Number: [id number]  YOB: 1954   Primary Physician: Michele Weinstein MD.     SUBJECTIVE  Prasanth Archer is a 69 year old man with PMHx of heart failure with reduced ejection fraction (LVEF 30-35%) with previous ICD placement, CAD,HTN, dyslipidemia. The patient has been following with his general cardiologist for some time now. He is established in heart failure clinic because he had started gaining weight and presents today for a follow-up.       He is unaccompanied.     Prasanth Archer is doing ok today but reports that he has been feeling 'spacey' after his stroke. He also reports that he had two syncopal episodes where he completely fell out.     He denies any chest pain, chest pressure, shortness of breath, orthopnea, paroxysmal nocturnal dyspnea or lower extremity edema.     Pt denies dizziness, lightheadedness, and weight gain. No claudication, presyncope, changes in appetite, early satiety, nausea/emesis, or abdominal bloating.     There were recent ER visits or hospitalizations. Pt was hospitalized for SOB 11/28/23 and hypotension 12/5/23.      Functional status is not significantly changed from prior visit.    He is adherent with medications and denies any side effects.     He lives at home.    Past Medical History:  Past Medical History:   Diagnosis Date    Atrial fibrillation (CMD)     BPH (benign prostatic hyperplasia)     Cardiomyopathy (CMD)     Congestive cardiac failure (CMD)     Coronary artery disease     Essential (primary) hypertension     Hematuria, microscopic 03/19/2019    Hyperkalemia 03/19/2019    Hyperlipidemia     ICD (implantable cardioverter-defibrillator) in place     Lung cancer (CMD) 10/2023    Lung mass     left lung three lesions    LV (left ventricular) mural thrombus following MI (CMD)     Myocardial  infarction (CMD)     RAD (reactive airway disease)     SVT (supraventricular tachycardia)        Past Surgical History:  Past Surgical History:   Procedure Laterality Date    Coronary angioplasty      Icd implant      Open access colonoscopy      Thoracentesis  10/2023    Us guide needle biopsy  10/2023       Past Family History:  Family History   Problem Relation Age of Onset    Cancer, Lung Mother 70    Heart disease Father     Cancer, Endometrial Sister         APX 40'S    Patient is unaware of any medical problems Sister     Patient is unaware of any medical problems Sister     Patient is unaware of any medical problems Sister     Cancer Maternal Grandmother 70        BLADDER    Patient is unaware of any medical problems Maternal Grandfather        Past Social History:  Social History     Tobacco Use    Smoking status: Former     Current packs/day: 0.00     Average packs/day: 0.5 packs/day for 46.0 years (23.0 ttl pk-yrs)     Types: Cigarettes     Start date:      Quit date:      Years since quittin.9    Smokeless tobacco: Never    Tobacco comments:     quit 5 yrs ago   Vaping Use    Vaping Use: never used   Substance Use Topics    Alcohol use: Yes     Alcohol/week: 3.0 standard drinks of alcohol     Types: 3 Glasses of wine per week     Comment: DRINK WINE W/ DINNER    Drug use: No       Medications:  No outpatient medications have been marked as taking for the 12/15/23 encounter (Appointment) with Omid May MD.       Allergies:  ALLERGIES:  No Known Allergies    General Review of Systems:    Constitutional: No fatigue. No unexpected weight change.   HENT: No nosebleeds.    Respiratory: As above  Cardiovascular: As above  Gastrointestinal: No for abdominal distention. No abdominal pain.   Endocrine: No for polyuria.   Genitourinary: No for hematuria.   Musculoskeletal: No myalgias.   Skin: No pallor.   Neurological: Yes syncope. No light-headedness.   Hematological: Does not  bruise/bleed easily.   Otherwise complete ROS is negative.     OBJECTIVE  Vitals:    There were no vitals taken for this visit.     Wt Readings from Last 4 Encounters:   12/13/23 85.3 kg (188 lb 0.8 oz)   12/08/23 90.4 kg (199 lb 4.7 oz)   12/05/23 87.5 kg (193 lb)   12/02/23 89.9 kg (198 lb 3.1 oz)       Devices:  ICD (implantable cardioverter-defibrillator)     Physical Exam:  Constitutional: 69 year old man is in no acute distress.  Skin: Warm, dry, intact, no lesions.  HEENT: Normocephalic, atraumatic. Oral mucous membranes moist, EOMs (extraocular movements) intact.  Neck: Supple, trachea midline, JVP is normal, negative HJR (hepatojugular reflux).  Cardiovascular: Normal S1, S2. regular rhythm. Murmur: none. negative S3 and S4.  Respiratory: Clear  to auscultation bilaterally.   Abdomen: negative distension. Soft, nontender, negative hepatomegaly and normal bowel sounds.  Musculoskeletal/Extremities: CRT (capillary refill time is <3). No clubbing, no cyanosis. Edema: no edema.  Neurological: No focal neurological deficits and speech normal. Sensation grossly intact.  Psychiatric: Alert and oriented to person, place and time.    Labs:  Lab Results   Component Value Date    POTASSIUM 3.7 12/13/2023    SODIUM 136 12/13/2023    CO2 25 12/13/2023    CHLORIDE 97 12/13/2023    BUN 13 12/13/2023    CREATININE 0.79 12/13/2023    GLUCOSE 130 (H) 12/13/2023    CALCIUM 8.6 12/13/2023    NTPROB 758 (H) 12/05/2023     Lab Results   Component Value Date    WBC 7.3 12/13/2023    HCT 37.7 (L) 12/13/2023    HGB 12.9 (L) 12/13/2023     (H) 12/13/2023     Lab Results   Component Value Date    NTPROB 758 (H) 12/05/2023    NTPROB 333 (H) 11/28/2023     Lab Results   Component Value Date    CREATININE 0.79 12/13/2023    CREATININE 0.72 12/09/2023   ''    I personally reviewed these labs above.    Diagnostic Test:  Encounter Date: 12/05/23   ECG   Result Value    Ventricular Rate EKG/Min (BPM) 141    Atrial Rate (BPM) 308     QRS-Interval (MSEC) 84    QT-Interval (MSEC) 326    QTc 499    R Axis (Degrees) -10    T Axis (Degrees) -85    REPORT TEXT      AT/AFL with variable AV conduction  Anteroseptal infarct  , age undetermined  Nonspecific ST and T wave abnormality  Confirmed by BLAZE ROCAH MD ([id number]) on 2023 8:21:11 PM         Echocardiogram:  Results for orders placed during the hospital encounter of 23    TRANSTHORACIC ECHO (TTE) COMPLETE W/ W/O IMAGING AGENT    Narrative  *[facility]*  [address] [place] [address]  Phone [phone]  Fax [phone]  Transthoracic Echocardiogram (TTE)    Patient: Prasanth Archer     Study Date/Time:       2023 10:39AM  MRN:     [id number]             FIN#:                  [id number]  :     1954          Ht/Wt:                 175.3cm 88.5kg  Age:     69                  BSA/BMI:               2.1m^2 28.8kg/m^2  Gender:  M                   Baseline BP:           82 / 52  Ordering Physician:     Corry Pepe    Referring Physician:    Corry Pepe    Attending Physician:    Armand Pimentel  Performing Physician:   Rocky Dong MD  Diagnostic Physician:   Rocky Dong MD  Sonographer:            Rogers Arceo RDCS    ------------------------------------------------------------------------------  INDICATIONS:   Cerebrovascular accident.    ------------------------------------------------------------------------------  STUDY CONCLUSIONS  SUMMARY:    1. Left ventricle: The cavity size is normal. Systolic function is severely  reduced. The ejection fraction was measured by visual estimation.  Hypokinesis of the apical wall. Hypokinesis of the mid-apicalanteroseptal  wall. Doppler parameters are consistent with abnormal left ventricular  relaxation (grade 1 diastolic dysfunction). There is no evidence of a  thrombus revealed by acoustic contrast opacification. The ejection fraction  is 35%. The average E/e' ratio is 9.  2.  Right ventricle: The cavity size is normal. Systolic function is normal by  visual assessment. Pacemaker lead noted in right ventricle.  3. Right atrium: Pacemaker lead noted in right atrium.  4. Atrial septum: Color doppler shows no obvious shunt. Bubble study is  negative but limited SN.  5. Tricuspid valve: There is trace regurgitation.  6. Inferior vena cava: The IVC is normal-sized.  IMPRESSIONS:  Compared to the prior echo 8/16/2023 there is no clear LV apical  clot. Bubble study is negative but limited SN.  RECOMMENDATIONS:    1. If clinically warranted, consider transesophageal echocardiography.  2. If clinically warranted, consider cardiac magnetic resonance scan.    ------------------------------------------------------------------------------  STUDY DATA:   Procedure:  A transthoracic echocardiogram was performed. Image  quality was suboptimal. Intravenous contrast (Definity 2ml and agitated  saline) was administered to identify intracardiac shunting and opacify the  left ventricle.  M-mode, complete 2D, complete spectral Doppler, color  Doppler, and intravenous contrast injection.  Study status:  Routine.  Study  completion:  There were no complications.    FINDINGS    LEFT VENTRICLE:  The cavity size is normal. Systolic function is severely  reduced. There is severe diffuse hypokinesis with regional variations.  Hypokinesis of the apical wall.  Hypokinesis of the mid-apicalanteroseptal  wall.  There is no evidence of a thrombus revealed by acoustic contrast  opacification.    The ejection fraction was measured by visual estimation. The  ejection fraction is 35%. The tissue Doppler parameters are abnormal. Doppler  parameters are consistent with abnormal left ventricular relaxation (grade 1  diastolic dysfunction). There is no evidence of elevated ventricular filling  pressure by Doppler parameters.    AORTIC VALVE:  Poorly visualized. The annulus is mildly calcified. The valve  is probably trileaflet.  Velocity is within the normal range. There is no  stenosis. There is no significant regurgitation. The peak systolic gradient is  5mm Hg. The ratio of LVOT to aortic valve peak velocity is 0.77. The valve  area is 3.2cm^2. The valve area index is 1.52cm^2/m^2.    AORTA:  Aortic root: The root is normal-sized.  Ascending aorta: The vessel is normal-sized.    MITRAL VALVE:  Not well visualized. The annulus is normal. The leaflets are  normal thickness. Leaflet separation is normal. Mobility is not restricted. No  evidence for prolapse. Inflow velocity is within the normal range. There is no  evidence for stenosis. There is no significant regurgitation.    ATRIAL SEPTUM:  Poorly visualized.  Color doppler shows no obvious shunt.    LEFT ATRIUM:  Not well visualized. The atrium is normal in size.    RIGHT VENTRICLE:  Not well visualized. The cavity size is normal. Systolic  function is normal by visual assessment. The TAPSE is normal, suggestive of  normal RV systolic function.  Systolic pressure is indeterminant due to the  absence of tricuspid regurgitation. The estimated peak pressure is 27mm Hg.  Pacemaker lead noted in right ventricle.       The RV pressure during systole  is 27mm Hg.    PULMONIC VALVE:   Not well visualized. There is no significant regurgitation.  The peak systolic gradient is 8mm Hg.    TRICUSPID VALVE:  Not well visualized. There is trace regurgitation.    RIGHT ATRIUM:  Not well visualized. The atrium is normal in size. Pacemaker  lead noted in right atrium.       The estimated central venous pressure is 3mm  Hg.    PERICARDIUM:   There is no pericardial effusion.    SYSTEMIC VEINS:  Inferior vena cava: The IVC is normal-sized.  Respirophasic diameter changes  are in the normal range (>= 50%).    ------------------------------------------------------------------------------  Measurements    Left ventricle           Value          Ref     08/16/2023  Aortic valve continued     Value          Ref        08/16/2023  EF                   (L) 35    %        52 - 72 25          Peak v, S                  1.1   m/sec    --------- 0.9  SV                       56    ml       ------- 39          Peak grad, S               5     mm Hg    --------- 3  SV/bsa                   27    ml/m^2   ------- 18          LVOT/AV, Vpeak ratio       0.77           --------- 1.01  E', lat hector, TDI     (L) 4.1   cm/sec   >=10.0  ----------  MARCI, Vmax                  3.2   cm^2     --------- 4.6  E/e', lat hector, TDI   (N) 11             <=13    ----------  MARCI/bsa, Vmax              1.52  cm^2/m^2 --------- 2.08  E', med hector, TDI     (L) 5.6   cm/sec   >=7.0   ----------  E/e', med hector, TDI       8              ------- ----------  Mitral valve               Value          Ref       08/16/2023  E', avg, TDI             4.875 cm/sec   ------- ----------  Peak E                     0.45  m/sec    --------- 0.34  E/e', avg, TDI       (N) 9              <=14    ----------  Peak A                     0.58  m/sec    --------- 0.5  Decel time                 322   ms       --------- 310  LVOT                     Value          Ref     08/16/2023  Peak E/A ratio             0.8            --------- 0.7  Diam, S                  2.3   cm       ------- 2.4  Area                     4.2   cm^2     ------- 4.5         Pulmonic valve             Value          Ref       08/16/2023  Peak fili, S              0.88  m/sec    ------- 0.89        Peak v, S                  1.4   m/sec    --------- 0.8  Peak grad, S             3     mm Hg    ------- 3           Peak grad, S               8     mm Hg    --------- 3    Right ventricle          Value          Ref     08/16/2023  Tricuspid valve            Value          Ref       08/16/2023  TAPSE, MM            (N) 1.9   cm       >=1.7   ----------  TR peak v              (N) 2.5   m/sec    <=2.8     1.9  Pressure, S              27    mm Hg    ------- 17          Peak RV-RA grad, S         24    mm Hg     --------- 14    Left atrium              Value          Ref     08/16/2023  Aortic root                Value          Ref       08/16/2023  Area ES, A4C         (N) 15    cm^2     <=20    14          Root diam, ED          (N) 3.5   cm       2.7 - 4.2 2.9  Area/bsa ES, A4C         6.92  cm^2/m^2 ------- 6.49        S-T junct diam, ED     (N) 2.8   cm       2.3 - 3.5 2.7  Area ES, A2C             16    cm^2     ------- 21          S-T junct diam/bsa, ED (N) 1.4   cm/m^2   1.1 - 1.9 1.2  Area/bsa ES, A2C         7.59  cm^2/m^2 ------- 9.36  Vol, ES, 1-p A4C     (N) 32    ml       18 - 58 35          Ascending aorta            Value          Ref       08/16/2023  Vol/bsa, ES, 1-p A4C (N) 15    ml/m^2   12 - 37 16          AAo AP diam, ED        (N) 3.7   cm       2.2 - 3.8 3.1  Vol, ES, 1-p A2C     (N) 43    ml       18 - 58 66          AAo AP diam/bsa, ED    (N) 1.8   cm/m^2   1.1 - 1.9 1.4  Vol/bsa, ES, 1-p A2C (N) 20    ml/m^2   11 - 43 30  Vol, ES, 2-p             39    ml       ------- 50          Pulmonary artery           Value          Ref       08/16/2023  Vol/bsa, ES, 2-p     (N) 19    ml/m^2   16 - 34 23          Pressure, S                19    mm Hg    --------- 14    Aortic valve             Value          Ref     08/16/2023  Systemic veins             Value          Ref       08/16/2023  Leaflet sep, MM          2.4   cm       ------- ----------  Estimated CVP              3     mm Hg    --------- 3  Legend:  (L)  and  (H)  mee values outside specified reference range.    (N)  marks values inside specified reference range.    Prepared and electronically signed by  Mee Dong MD  11/29/2023 12:38       Cath Report:  Results for orders placed or performed during the hospital encounter of 10/07/21   Cath/PV Case    Narrative    The patient has normal filling pressures on current guideline directed   medical therapy with a borderline normal cardiac index.  Patient will continue to take current  medications with some optimization   of diuretic dosing  Patient does not have evidence of pulmonary hypertension    Omid May MD  Advanced Heart Failure  [facility]         I personally reviewed the above diagnostic tests.    ASSESSMENT/PLAN  69 year old man with PMHx of heart failure with reduced ejection fraction (LVEF 30-35%) with previous ICD placement, CAD,HTN, dyslipidemia. The patient has been following with his general cardiologist for some time now. He is established in heart failure clinic because he had started gaining weight and presents today for a follow-up.     Impression:  -Due to patient being symptomatic we will stop his Entresto. We believe his body is under inflammatory strain and we will adjust his GDMT to help optimize him.   - Patient is currently orthostatic where his BP normalizes when he sits down and spikes while he's standing.   -There is a possibility that his pleural effusion may have reappeared as well which means he may be losing volume there. This may be taking his protein and we may have to reduce his diuretic as well.   -NTproBNP and BMP ordered.   -Due to hypotension, we will stop his torsemide for tomorrow.     Problem List Items Addressed This Visit    None      Heart Failure  - ACC/ AHA Stage C: Structural heart disease with prior or current symptoms of heart failure, New York Heart Association Classification: NYHA Class III: Significant HF symptoms/activity intolerance (Only able to do light housework, can walk slowly on level ground)  - Etiology: Chronic Compensated HFrEF, ischemic cardiomyopathy.    - LVEF:   Ejection Fraction   Date Value Ref Range Status   12/08/2023 30%  Corrected       Lab Results   Component Value Date    NTPROB 758 (H) 12/05/2023    NTPROB 333 (H) 11/28/2023     - Volume status is normal, we are assessing dehydration.    - Perfusion status is low.   - Most recent labs are Reviewed.    Current GDMT:  ARNI/ARB/ACEI: No.  We are holding off on Entresto for now     Beta Blocker: Yes.   SGLT-2 Inhibitor: Yes.   MRA (Spironolactone or Eplerenone): No. Considered spironolactone but potassium has been elevated.    Hydralazine and isosorbide dinitrate therapy for HFrEF in those patients self-identified as Black or : No. N/A      Based on objective data and clinical data will augment medical therapy as follows:    Optimized GDMT (Guideline-directed medical therapy):   -Continue metoprolol succinate 50 mg daily   -Continue Jardiance 10 mg daily   -Continue nitroglycerin 0.4 mg prn   -Continue torsemide 20 mg daily     Coronary Artery Disease  - Continue atorvastatin 80 mg daily     Atrial Fibrillation  - CHADSVASc Stroke Risk Score = 4  Lab Results   Component Value Date    TSH 2.140 11/28/2023     - Anticoagulation (DOAC) for Atrial flutter or atrial fibrillation: Yes   - Continue antiarrhythmics  - Continue anticoagulant: clopidogrel 75 mg daily & Eliquis 5 mg daily     Hypertension  - BP controlled  - See GDMT above  - Monitor blood pressure (Goal 120/80)   - SPRINT found that the lower target (less than 120 mm Hg) reduced cardiovascular events by 25 percent and reduced the overall risk of death by 27 percent.    Hyperlipidemia  Lab Results   Component Value Date    CHOLESTEROL 126 11/29/2023    TRIGLYCERIDE 95 11/29/2023    HDL 66 11/29/2023    CALCLDL 41 11/29/2023     - Continue Statin    CKD  Lab Results   Component Value Date    CREATININE 0.79 12/13/2023     Glomerular Filtration Rate (no units)   Date Value   12/13/2023 >90       DM  Hemoglobin A1C (%)   Date Value   11/28/2023 6.0 (H)       Consideration of ICD ( for primary prevention of sudden cardiac death, LVEF ? 35%):  ICD in place   Consideration of  CRT-D or CRT-P (LVEF ? 35% with QRS >150 and LBBB) :  ICD in place     If moderate-to-severe or severe MR, has patient been referred to valve clinic?: N/A    Dietary Guidelines discussed with the patient,  instructed to consume < 2.5 g Na per day. I have reviewed/discussed fluid intake, daily measurement of weight recommendations. I also included education on the patient's disease process including a discussion on the physiology of heart failure and progression of disease.  Heart failure booklet provided and reviewed all signs and symptoms of CHF and when/why to contact the heart failure clinic.       Follow-up: January 2024    - I spent a total of 13 minutes on the day of the visit.     - Time was spent on pre-charting, full patient visit and history obtained, physical exam obtained, counseling provided, note written and coordination of care. At least 13 minutes were spent on counseling and coordination of care face to face.     - Patient's chart was thoroughly reviewed before and after being seen in office.     - Patient left the office in no acute distress and understanding the plan of care. All questions were answered to satisfaction. Patient was encouraged to call, or message directly though the patient portal with any questions or concerns, and return to the clinic as needed.      - The Cures Act requires that medical notes be available to patients in the interest of transparency; however, be advised this is a medical document that is intended as peer to peer communication. It is written in medical language and may contain abbreviations or verbage that are unfamiliar. It may appear direct as medical documents are intended to carry relevant information, facts as evident, and the clinical opinion of the practitioner.     - Understanding and agreement was voiced with all above plans.  This document  was partially dictated using voice recognition software. Rapid proofreading was performed to expedite delivery of information. Phonetic errors will occur, which are common with voice recognition software. If there is concern about meaning or content, please contact the office directly.     - Other errors in  documentation including omission of facts or data may occur due to the recommended timeframe in which documentation must be written and signed as recommended by the governing institution. Accordingly, the physician has made attempts at writing an inclusive note but errors may occur and may need to be corrected after the note is signed and placed in electronic record.    - Patient understands they can return sooner if any issues such as orthopnea, dyspnea on exertion, paroxysmal nocturnal dyspnea, dizziness, lightheadedness or angina should arise. If the worsening symptoms or concerns are great, they should contact the office or if unable to, seek care in the emergency department.    On 12/15/2023, IArianna scribed the services personally performed by Dr. May.    I have reviewed and revised the note above. The documentation recorded by the scribe accurately reflects history obtained, actions preformed and decisions made by me.     Omid May MD  Advanced Heart Failure  [facility] [place]    13-Apr-2020 18:07

## 2024-01-31 NOTE — ED ADULT TRIAGE NOTE - BMI (KG/M2)
Result Communication    Resulted Orders   CBC and Auto Differential   Result Value Ref Range    WBC 11.2 5.0 - 17.0 x10*3/uL    nRBC 0.0 0.0 - 0.0 /100 WBCs    RBC 5.72 (H) 3.90 - 5.30 x10*6/uL    Hemoglobin 10.9 (L) 11.5 - 13.5 g/dL    Hematocrit 34.8 34.0 - 40.0 %    MCV 61 (L) 75 - 87 fL    MCH 19.1 (L) 24.0 - 30.0 pg    MCHC 31.3 31.0 - 37.0 g/dL    RDW 19.8 (H) 11.5 - 14.5 %    Platelets 417 (H) 150 - 400 x10*3/uL    Neutrophils % 56.6 17.0 - 45.0 %    Immature Granulocytes %, Automated 0.3 0.0 - 1.0 %      Comment:      Immature Granulocyte Count (IG) includes promyelocytes, myelocytes and metamyelocytes but does not include bands. Percent differential counts (%) should be interpreted in the context of the absolute cell counts (cells/UL).    Lymphocytes % 32.0 40.0 - 76.0 %    Monocytes % 8.2 3.0 - 9.0 %    Eosinophils % 2.2 0.0 - 5.0 %    Basophils % 0.7 0.0 - 1.0 %    Neutrophils Absolute 6.36 1.50 - 7.00 x10*3/uL      Comment:      Percent differential counts (%) should be interpreted in the context of the absolute cell counts (cells/uL).    Immature Granulocytes Absolute, Automated 0.03 0.00 - 0.10 x10*3/uL    Lymphocytes Absolute 3.60 2.50 - 8.00 x10*3/uL    Monocytes Absolute 0.92 0.10 - 1.40 x10*3/uL    Eosinophils Absolute 0.25 0.00 - 0.70 x10*3/uL    Basophils Absolute 0.08 0.00 - 0.10 x10*3/uL   Sedimentation Rate   Result Value Ref Range    Sedimentation Rate 16 (H) 0 - 13 mm/h   C-reactive protein   Result Value Ref Range    C-Reactive Protein 0.79 <1.00 mg/dL       12:03 PM      Left message on voicemail.  Problem List Items Addressed This Visit       Anemia - Primary    Relevant Medications    multivitamin-children's (Cerovite, Jr) chewable tablet   Will check CBC, hemoglobin identification, iron studies in 3 months.     Still awaiting allergy testing.      
23.3

## 2024-07-25 NOTE — ED ADULT NURSE NOTE - PSH
LMOM to see if Pt wanted to switch their Pre-Op appt from [employer] to "[employer]. This is a new offering. Please call back to confirm. [phone].  
No significant past surgical history

## 2024-11-06 NOTE — ED PROVIDER NOTE - PATIENT PORTAL LINK FT
73
You can access the FollowMyHealth Patient Portal offered by [facility] by registering at the following website: [email or web address]. By joining [employer]’s FollowMyHealth portal, you will also be able to view your health information using other applications (apps) compatible with our system.

## 2025-02-23 NOTE — ED ADULT NURSE NOTE - CINV DISCH MEDS REVIEWED YN
normal/clear to auscultation bilaterally/no wheezes/no rales/no rhonchi normal/clear to auscultation bilaterally/no wheezes/no rales/no rhonchi MDI/Yes